# Patient Record
Sex: MALE | Race: WHITE | NOT HISPANIC OR LATINO | URBAN - METROPOLITAN AREA
[De-identification: names, ages, dates, MRNs, and addresses within clinical notes are randomized per-mention and may not be internally consistent; named-entity substitution may affect disease eponyms.]

---

## 2023-09-14 LAB
ANION GAP IN SER/PLAS: 9 MMOL/L (ref 10–20)
BASOPHILS (10*3/UL) IN BLOOD BY AUTOMATED COUNT: 0.02 X10E9/L (ref 0–0.1)
BASOPHILS/100 LEUKOCYTES IN BLOOD BY AUTOMATED COUNT: 0.3 % (ref 0–2)
CALCIUM (MG/DL) IN SER/PLAS: 9.5 MG/DL (ref 8.6–10.3)
CARBON DIOXIDE, TOTAL (MMOL/L) IN SER/PLAS: 31 MMOL/L (ref 21–32)
CHLORIDE (MMOL/L) IN SER/PLAS: 102 MMOL/L (ref 98–107)
CREATININE (MG/DL) IN SER/PLAS: 0.9 MG/DL (ref 0.5–1.3)
EOSINOPHILS (10*3/UL) IN BLOOD BY AUTOMATED COUNT: 0.18 X10E9/L (ref 0–0.7)
EOSINOPHILS/100 LEUKOCYTES IN BLOOD BY AUTOMATED COUNT: 2.7 % (ref 0–6)
ERYTHROCYTE DISTRIBUTION WIDTH (RATIO) BY AUTOMATED COUNT: 11.9 % (ref 11.5–14.5)
ERYTHROCYTE MEAN CORPUSCULAR HEMOGLOBIN CONCENTRATION (G/DL) BY AUTOMATED: 33.3 G/DL (ref 32–36)
ERYTHROCYTE MEAN CORPUSCULAR VOLUME (FL) BY AUTOMATED COUNT: 88 FL (ref 80–100)
ERYTHROCYTES (10*6/UL) IN BLOOD BY AUTOMATED COUNT: 5.05 X10E12/L (ref 4.5–5.9)
GFR MALE: >90 ML/MIN/1.73M2
GLUCOSE (MG/DL) IN SER/PLAS: 177 MG/DL (ref 74–99)
HEMATOCRIT (%) IN BLOOD BY AUTOMATED COUNT: 44.4 % (ref 41–52)
HEMOGLOBIN (G/DL) IN BLOOD: 14.8 G/DL (ref 13.5–17.5)
IMMATURE GRANULOCYTES/100 LEUKOCYTES IN BLOOD BY AUTOMATED COUNT: 0.3 % (ref 0–0.9)
INR IN PPP BY COAGULATION ASSAY: 1 (ref 0.9–1.1)
LEUKOCYTES (10*3/UL) IN BLOOD BY AUTOMATED COUNT: 6.7 X10E9/L (ref 4.4–11.3)
LYMPHOCYTES (10*3/UL) IN BLOOD BY AUTOMATED COUNT: 1.38 X10E9/L (ref 1.2–4.8)
LYMPHOCYTES/100 LEUKOCYTES IN BLOOD BY AUTOMATED COUNT: 20.6 % (ref 13–44)
MONOCYTES (10*3/UL) IN BLOOD BY AUTOMATED COUNT: 0.6 X10E9/L (ref 0.1–1)
MONOCYTES/100 LEUKOCYTES IN BLOOD BY AUTOMATED COUNT: 8.9 % (ref 2–10)
NEUTROPHILS (10*3/UL) IN BLOOD BY AUTOMATED COUNT: 4.51 X10E9/L (ref 1.2–7.7)
NEUTROPHILS/100 LEUKOCYTES IN BLOOD BY AUTOMATED COUNT: 67.2 % (ref 40–80)
PLATELETS (10*3/UL) IN BLOOD AUTOMATED COUNT: 163 X10E9/L (ref 150–450)
POTASSIUM (MMOL/L) IN SER/PLAS: 4.3 MMOL/L (ref 3.5–5.3)
PROTHROMBIN TIME (PT) IN PPP BY COAGULATION ASSAY: 11.8 SEC (ref 9.8–12.8)
SODIUM (MMOL/L) IN SER/PLAS: 138 MMOL/L (ref 136–145)
UREA NITROGEN (MG/DL) IN SER/PLAS: 19 MG/DL (ref 6–23)

## 2023-09-19 ENCOUNTER — HOSPITAL ENCOUNTER (OUTPATIENT)
Facility: HOSPITAL | Age: 68
Setting detail: OUTPATIENT SURGERY
End: 2023-09-19
Attending: STUDENT IN AN ORGANIZED HEALTH CARE EDUCATION/TRAINING PROGRAM | Admitting: STUDENT IN AN ORGANIZED HEALTH CARE EDUCATION/TRAINING PROGRAM
Payer: MEDICARE

## 2023-09-19 DIAGNOSIS — S93.124D DISLOCATION OF METATARSOPHALANGEAL JOINT OF LESSER TOE OF RIGHT FOOT, SUBSEQUENT ENCOUNTER: Primary | ICD-10-CM

## 2023-09-20 ENCOUNTER — HOSPITAL ENCOUNTER (OUTPATIENT)
Facility: HOSPITAL | Age: 68
Setting detail: OUTPATIENT SURGERY
End: 2023-09-20
Admitting: STUDENT IN AN ORGANIZED HEALTH CARE EDUCATION/TRAINING PROGRAM
Payer: MEDICARE

## 2023-09-28 ENCOUNTER — HOSPITAL ENCOUNTER (OUTPATIENT)
Dept: DATA CONVERSION | Facility: HOSPITAL | Age: 68
Discharge: HOME | End: 2023-09-28

## 2023-09-28 DIAGNOSIS — S93.124A DISLOCATION OF METATARSOPHALANGEAL JOINT OF RIGHT LESSER TOE(S), INITIAL ENCOUNTER: ICD-10-CM

## 2023-09-28 LAB
MRSA DNA SPEC QL NAA+PROBE: NEGATIVE
SPECIMEN SOURCE: NORMAL

## 2023-10-02 ENCOUNTER — PREP FOR PROCEDURE (OUTPATIENT)
Dept: ORTHOPEDIC SURGERY | Facility: CLINIC | Age: 68
End: 2023-10-02
Payer: MEDICARE

## 2023-10-02 ENCOUNTER — TELEPHONE (OUTPATIENT)
Dept: ORTHOPEDIC SURGERY | Facility: CLINIC | Age: 68
End: 2023-10-02
Payer: MEDICARE

## 2023-10-02 DIAGNOSIS — Z95.0 PACEMAKER: Primary | ICD-10-CM

## 2023-10-02 NOTE — TELEPHONE ENCOUNTER
I contacted the patient via the number listed in the chart, [phone].  I was informed by PAT for [facility] that patient's schedule surgery for 10/05/2023 would be canceled as he has a pacemaker which has not been interrogated and the recommendation has been made for cardiac clearance prior to consideration for surgery.  I reviewed this with the patient and have placed a referral to cardiology for evaluation.  I will work with my  to obtain clearance and reschedule him.    Yogi Melo MD, GISELLE  Department of Orthopaedic Surgery  [facility]    Note dictated with [employer] software.  Completed without full type editing and sent to avoid delay.

## 2023-10-11 ENCOUNTER — HOSPITAL ENCOUNTER (OUTPATIENT)
Dept: CARDIOLOGY | Facility: HOSPITAL | Age: 68
Discharge: HOME | End: 2023-10-11
Payer: MEDICARE

## 2023-10-11 ENCOUNTER — OFFICE VISIT (OUTPATIENT)
Dept: CARDIOLOGY | Facility: HOSPITAL | Age: 68
End: 2023-10-11
Payer: MEDICARE

## 2023-10-11 VITALS
HEART RATE: 86 BPM | OXYGEN SATURATION: 93 % | DIASTOLIC BLOOD PRESSURE: 97 MMHG | SYSTOLIC BLOOD PRESSURE: 140 MMHG | WEIGHT: 198 LBS

## 2023-10-11 DIAGNOSIS — Z95.0 PACEMAKER: ICD-10-CM

## 2023-10-11 DIAGNOSIS — Z95.0 PRESENCE OF PERMANENT CARDIAC PACEMAKER: ICD-10-CM

## 2023-10-11 DIAGNOSIS — I44.1 SECOND DEGREE AV BLOCK: Primary | ICD-10-CM

## 2023-10-11 DIAGNOSIS — Z00.00 HEALTHCARE MAINTENANCE: ICD-10-CM

## 2023-10-11 DIAGNOSIS — I44.1 SECOND DEGREE AV BLOCK: ICD-10-CM

## 2023-10-11 PROCEDURE — 1159F MED LIST DOCD IN RCRD: CPT | Performed by: NURSE PRACTITIONER

## 2023-10-11 PROCEDURE — 93290 INTERROG DEV EVAL ICPMS IP: CPT

## 2023-10-11 PROCEDURE — 1126F AMNT PAIN NOTED NONE PRSNT: CPT | Performed by: NURSE PRACTITIONER

## 2023-10-11 PROCEDURE — 99204 OFFICE O/P NEW MOD 45 MIN: CPT | Performed by: NURSE PRACTITIONER

## 2023-10-11 PROCEDURE — 99214 OFFICE O/P EST MOD 30 MIN: CPT | Performed by: NURSE PRACTITIONER

## 2023-10-11 PROCEDURE — 93281 PM DEVICE PROGR EVAL MULTI: CPT

## 2023-10-11 PROCEDURE — 93290 INTERROG DEV EVAL ICPMS IP: CPT | Performed by: INTERNAL MEDICINE

## 2023-10-11 PROCEDURE — 1160F RVW MEDS BY RX/DR IN RCRD: CPT | Performed by: NURSE PRACTITIONER

## 2023-10-11 PROCEDURE — 93281 PM DEVICE PROGR EVAL MULTI: CPT | Performed by: INTERNAL MEDICINE

## 2023-10-11 RX ORDER — METFORMIN HYDROCHLORIDE 500 MG/1
500 TABLET ORAL
COMMUNITY
End: 2024-04-04 | Stop reason: SDUPTHER

## 2023-10-11 ASSESSMENT — PAIN SCALES - GENERAL: PAINLEVEL: 0-NO PAIN

## 2023-10-11 NOTE — PROGRESS NOTES
Chief Complaint:  Mr Moody is a 67yo patient referred by pre-admission testing to Cardiology due to permanent pacemaker.     HPI  67yo patient here for cardiovascular risk stratification prior to orthopedic surgery.     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   Social Hx: Never smoker. Denies EtoH/illicit drug use.   Family Hx: Father- CAD s/p MI @ 75yo.     Denies chest pain. Denies palpitations. Denies SOB on exertion. Denies orthopnea. Denies lightheadedness, dizziness, and syncope. Endorses isolated episode of vertigo x 1 hour c/b fall. Denies edema. Eating/drinking well. Denies N/V/D. Thinks PPM has not been interrogated since 0324-8570.     Review of Systems   Constitutional: Negative for decreased appetite, weight gain and weight loss.   Cardiovascular:  Negative for chest pain, dyspnea on exertion, irregular heartbeat, leg swelling, near-syncope, orthopnea, palpitations, paroxysmal nocturnal dyspnea and syncope.   Respiratory:  Negative for shortness of breath and sleep disturbances due to breathing.    Gastrointestinal:  Negative for diarrhea, nausea and vomiting.   Neurological:  Positive for vertigo. Negative for dizziness and light-headedness.     Visit Vitals  BP (!) 140/97   Pulse 86   Wt 89.8 kg (198 lb)   SpO2 93%   Smoking Status Never Assessed       Objective   Vitals reviewed.   Constitutional:       Appearance: Healthy appearance.   Neck:      Vascular: No hepatojugular reflux or JVD. JVD normal.   Pulmonary:      Effort: Pulmonary effort is normal.      Breath sounds: Normal breath sounds.   Cardiovascular:      Normal rate. Regular rhythm.      Murmurs: There is no murmur.      No gallop.  No click. No rub.   Edema:     Peripheral edema absent.   Abdominal:      General: There is no distension.   Skin:     General: Skin is warm and dry.   Neurological:      Mental Status: Alert and oriented to person, place and time.         Lab  "Review:   Lab Results   Component Value Date     09/14/2023    K 4.3 09/14/2023     09/14/2023    CO2 31 09/14/2023    BUN 19 09/14/2023    CREATININE 0.90 09/14/2023    GLUCOSE 177 (H) 09/14/2023    CALCIUM 9.5 09/14/2023     Lab Results   Component Value Date    WBC 6.7 09/14/2023    HGB 14.8 09/14/2023    HCT 44.4 09/14/2023    MCV 88 09/14/2023     09/14/2023     No results found for: \"CHOL\", \"TRIG\", \"HDL\", \"LDLDIRECT\"      Current Outpatient Medications:     metFORMIN (Glucophage) 500 mg tablet, Take 1 tablet (500 mg) by mouth once daily with a meal., Disp: , Rfl:       Assessment/Plan   Cardiovascular Risk Stratification  Asymptomatic from a cardiovascular standpoint. Appears euvolemic and hypertensive on exam. Unable to located EKG reportedly performed on 10/9/23.  CTA coronary 2018 [facility] w/normal origin of coronary arteries. No significant atherosclerotic change or stenotic disease in left main, LCx. Severely calcified plaque in pLAD w/25-50% stenosis and mild plaque with <25% stenosis in RCA. RCRI 0 points Class I Risk w/3.9% 30-day risk of death, MI, or cardiac arrest. Referral to Primary Care for health maintenance and ASCVD prevention.     Hx of symptomatic 2nd AVB s/p PPM   Episode of precordial pain, vertigo on exertion. PPM placed at [facility] in 2018, followed by Dr. Virgen/Dr. Pace. EP MELODY Elmo Barton performed device inerrogation today in visit. Referral to establish with Electrophysiology.   "

## 2023-10-12 ASSESSMENT — ENCOUNTER SYMPTOMS
NAUSEA: 0
IRREGULAR HEARTBEAT: 0
PND: 0
SHORTNESS OF BREATH: 0
DECREASED APPETITE: 0
DYSPNEA ON EXERTION: 0
LIGHT-HEADEDNESS: 0
SYNCOPE: 0
PALPITATIONS: 0
NEAR-SYNCOPE: 0
ORTHOPNEA: 0
DIARRHEA: 0
WEIGHT GAIN: 0
VERTIGO: 1
SLEEP DISTURBANCES DUE TO BREATHING: 0
DIZZINESS: 0
VOMITING: 0
WEIGHT LOSS: 0

## 2023-10-17 ENCOUNTER — PREP FOR PROCEDURE (OUTPATIENT)
Dept: ORTHOPEDIC SURGERY | Facility: HOSPITAL | Age: 68
End: 2023-10-17
Payer: MEDICARE

## 2023-10-17 DIAGNOSIS — S93.124D DISLOCATION OF METATARSOPHALANGEAL JOINT OF LESSER TOE OF RIGHT FOOT, SUBSEQUENT ENCOUNTER: Primary | ICD-10-CM

## 2023-10-17 PROBLEM — S93.124A DISLOCATION OF METATARSOPHALANGEAL JOINT OF LESSER TOE OF RIGHT FOOT: Status: ACTIVE | Noted: 2023-10-17

## 2023-10-17 RX ORDER — CEFAZOLIN SODIUM 2 G/100ML
2 INJECTION, SOLUTION INTRAVENOUS ONCE
Status: CANCELLED | OUTPATIENT
Start: 2023-10-17 | End: 2023-10-17

## 2023-10-18 ENCOUNTER — ANESTHESIA EVENT (OUTPATIENT)
Dept: OPERATING ROOM | Facility: HOSPITAL | Age: 68
End: 2023-10-18
Payer: MEDICARE

## 2023-10-19 ENCOUNTER — HOSPITAL ENCOUNTER (OUTPATIENT)
Facility: HOSPITAL | Age: 68
Setting detail: OUTPATIENT SURGERY
Discharge: HOME | End: 2023-10-19
Attending: STUDENT IN AN ORGANIZED HEALTH CARE EDUCATION/TRAINING PROGRAM | Admitting: STUDENT IN AN ORGANIZED HEALTH CARE EDUCATION/TRAINING PROGRAM
Payer: MEDICARE

## 2023-10-19 ENCOUNTER — ANESTHESIA (OUTPATIENT)
Dept: OPERATING ROOM | Facility: HOSPITAL | Age: 68
End: 2023-10-19
Payer: MEDICARE

## 2023-10-19 ENCOUNTER — APPOINTMENT (OUTPATIENT)
Dept: RADIOLOGY | Facility: HOSPITAL | Age: 68
End: 2023-10-19
Payer: MEDICARE

## 2023-10-19 VITALS
RESPIRATION RATE: 15 BRPM | BODY MASS INDEX: 27.87 KG/M2 | HEIGHT: 70 IN | WEIGHT: 194.67 LBS | HEART RATE: 65 BPM | TEMPERATURE: 96.8 F | OXYGEN SATURATION: 95 % | SYSTOLIC BLOOD PRESSURE: 145 MMHG | DIASTOLIC BLOOD PRESSURE: 64 MMHG

## 2023-10-19 DIAGNOSIS — M20.5X1 ACQUIRED CLAW TOE OF RIGHT FOOT: ICD-10-CM

## 2023-10-19 DIAGNOSIS — S93.124D DISLOCATION OF METATARSOPHALANGEAL JOINT OF LESSER TOE OF RIGHT FOOT, SUBSEQUENT ENCOUNTER: Primary | ICD-10-CM

## 2023-10-19 LAB
GLUCOSE BLD MANUAL STRIP-MCNC: 160 MG/DL (ref 74–99)
GLUCOSE BLD MANUAL STRIP-MCNC: 201 MG/DL (ref 74–99)

## 2023-10-19 PROCEDURE — 7100000002 HC RECOVERY ROOM TIME - EACH INCREMENTAL 1 MINUTE: Performed by: STUDENT IN AN ORGANIZED HEALTH CARE EDUCATION/TRAINING PROGRAM

## 2023-10-19 PROCEDURE — 28285 REPAIR OF HAMMERTOE: CPT | Performed by: STUDENT IN AN ORGANIZED HEALTH CARE EDUCATION/TRAINING PROGRAM

## 2023-10-19 PROCEDURE — 7100000010 HC PHASE TWO TIME - EACH INCREMENTAL 1 MINUTE: Performed by: STUDENT IN AN ORGANIZED HEALTH CARE EDUCATION/TRAINING PROGRAM

## 2023-10-19 PROCEDURE — 2720000007 HC OR 272 NO HCPCS: Performed by: STUDENT IN AN ORGANIZED HEALTH CARE EDUCATION/TRAINING PROGRAM

## 2023-10-19 PROCEDURE — 7100000001 HC RECOVERY ROOM TIME - INITIAL BASE CHARGE: Performed by: STUDENT IN AN ORGANIZED HEALTH CARE EDUCATION/TRAINING PROGRAM

## 2023-10-19 PROCEDURE — 28308 INCISION OF METATARSAL: CPT | Performed by: STUDENT IN AN ORGANIZED HEALTH CARE EDUCATION/TRAINING PROGRAM

## 2023-10-19 PROCEDURE — A4217 STERILE WATER/SALINE, 500 ML: HCPCS | Performed by: STUDENT IN AN ORGANIZED HEALTH CARE EDUCATION/TRAINING PROGRAM

## 2023-10-19 PROCEDURE — 3600000009 HC OR TIME - EACH INCREMENTAL 1 MINUTE - PROCEDURE LEVEL FOUR: Performed by: STUDENT IN AN ORGANIZED HEALTH CARE EDUCATION/TRAINING PROGRAM

## 2023-10-19 PROCEDURE — 3700000002 HC GENERAL ANESTHESIA TIME - EACH INCREMENTAL 1 MINUTE: Performed by: STUDENT IN AN ORGANIZED HEALTH CARE EDUCATION/TRAINING PROGRAM

## 2023-10-19 PROCEDURE — C1713 ANCHOR/SCREW BN/BN,TIS/BN: HCPCS | Performed by: STUDENT IN AN ORGANIZED HEALTH CARE EDUCATION/TRAINING PROGRAM

## 2023-10-19 PROCEDURE — 7100000009 HC PHASE TWO TIME - INITIAL BASE CHARGE: Performed by: STUDENT IN AN ORGANIZED HEALTH CARE EDUCATION/TRAINING PROGRAM

## 2023-10-19 PROCEDURE — 76000 FLUOROSCOPY <1 HR PHYS/QHP: CPT | Mod: 59

## 2023-10-19 PROCEDURE — 28645 REPAIR TOE DISLOCATION: CPT | Performed by: STUDENT IN AN ORGANIZED HEALTH CARE EDUCATION/TRAINING PROGRAM

## 2023-10-19 PROCEDURE — 2500000005 HC RX 250 GENERAL PHARMACY W/O HCPCS: Performed by: STUDENT IN AN ORGANIZED HEALTH CARE EDUCATION/TRAINING PROGRAM

## 2023-10-19 PROCEDURE — 3600000004 HC OR TIME - INITIAL BASE CHARGE - PROCEDURE LEVEL FOUR: Performed by: STUDENT IN AN ORGANIZED HEALTH CARE EDUCATION/TRAINING PROGRAM

## 2023-10-19 PROCEDURE — 82947 ASSAY GLUCOSE BLOOD QUANT: CPT

## 2023-10-19 PROCEDURE — 3700000001 HC GENERAL ANESTHESIA TIME - INITIAL BASE CHARGE: Performed by: STUDENT IN AN ORGANIZED HEALTH CARE EDUCATION/TRAINING PROGRAM

## 2023-10-19 PROCEDURE — 2500000004 HC RX 250 GENERAL PHARMACY W/ HCPCS (ALT 636 FOR OP/ED): Performed by: STUDENT IN AN ORGANIZED HEALTH CARE EDUCATION/TRAINING PROGRAM

## 2023-10-19 PROCEDURE — 82947 ASSAY GLUCOSE BLOOD QUANT: CPT | Mod: 59

## 2023-10-19 PROCEDURE — 2780000003 HC OR 278 NO HCPCS: Performed by: STUDENT IN AN ORGANIZED HEALTH CARE EDUCATION/TRAINING PROGRAM

## 2023-10-19 PROCEDURE — 99024 POSTOP FOLLOW-UP VISIT: CPT | Performed by: STUDENT IN AN ORGANIZED HEALTH CARE EDUCATION/TRAINING PROGRAM

## 2023-10-19 RX ORDER — ONDANSETRON HYDROCHLORIDE 2 MG/ML
INJECTION, SOLUTION INTRAVENOUS AS NEEDED
Status: DISCONTINUED | OUTPATIENT
Start: 2023-10-19 | End: 2023-10-19

## 2023-10-19 RX ORDER — MIDAZOLAM HYDROCHLORIDE 1 MG/ML
2 INJECTION, SOLUTION INTRAMUSCULAR; INTRAVENOUS ONCE
Status: COMPLETED | OUTPATIENT
Start: 2023-10-19 | End: 2023-10-19

## 2023-10-19 RX ORDER — ONDANSETRON HYDROCHLORIDE 2 MG/ML
4 INJECTION, SOLUTION INTRAVENOUS ONCE AS NEEDED
Status: DISCONTINUED | OUTPATIENT
Start: 2023-10-19 | End: 2023-10-19 | Stop reason: HOSPADM

## 2023-10-19 RX ORDER — FENTANYL CITRATE 50 UG/ML
50 INJECTION, SOLUTION INTRAMUSCULAR; INTRAVENOUS ONCE
Status: COMPLETED | OUTPATIENT
Start: 2023-10-19 | End: 2023-10-19

## 2023-10-19 RX ORDER — ASPIRIN 325 MG
325 TABLET, DELAYED RELEASE (ENTERIC COATED) ORAL
Qty: 84 TABLET | Refills: 0 | Status: SHIPPED | OUTPATIENT
Start: 2023-10-19 | End: 2023-11-30

## 2023-10-19 RX ORDER — DOCUSATE SODIUM 100 MG/1
100 CAPSULE, LIQUID FILLED ORAL 2 TIMES DAILY PRN
Qty: 20 CAPSULE | Refills: 0 | Status: SHIPPED | OUTPATIENT
Start: 2023-10-19 | End: 2023-10-29

## 2023-10-19 RX ORDER — LIDOCAINE HYDROCHLORIDE 10 MG/ML
INJECTION INFILTRATION; PERINEURAL AS NEEDED
Status: DISCONTINUED | OUTPATIENT
Start: 2023-10-19 | End: 2023-10-19

## 2023-10-19 RX ORDER — FENTANYL CITRATE 50 UG/ML
50 INJECTION, SOLUTION INTRAMUSCULAR; INTRAVENOUS EVERY 5 MIN PRN
Status: DISCONTINUED | OUTPATIENT
Start: 2023-10-19 | End: 2023-10-19 | Stop reason: HOSPADM

## 2023-10-19 RX ORDER — FENTANYL CITRATE 50 UG/ML
25 INJECTION, SOLUTION INTRAMUSCULAR; INTRAVENOUS EVERY 5 MIN PRN
Status: DISCONTINUED | OUTPATIENT
Start: 2023-10-19 | End: 2023-10-19 | Stop reason: HOSPADM

## 2023-10-19 RX ORDER — SODIUM CHLORIDE 0.9 G/100ML
IRRIGANT IRRIGATION AS NEEDED
Status: DISCONTINUED | OUTPATIENT
Start: 2023-10-19 | End: 2023-10-19 | Stop reason: HOSPADM

## 2023-10-19 RX ORDER — ALBUTEROL SULFATE 0.83 MG/ML
2.5 SOLUTION RESPIRATORY (INHALATION) ONCE AS NEEDED
Status: DISCONTINUED | OUTPATIENT
Start: 2023-10-19 | End: 2023-10-19 | Stop reason: HOSPADM

## 2023-10-19 RX ORDER — CEFAZOLIN SODIUM 2 G/100ML
2 INJECTION, SOLUTION INTRAVENOUS ONCE
Status: COMPLETED | OUTPATIENT
Start: 2023-10-19 | End: 2023-10-19

## 2023-10-19 RX ORDER — IPRATROPIUM BROMIDE 0.5 MG/2.5ML
500 SOLUTION RESPIRATORY (INHALATION) ONCE
Status: DISCONTINUED | OUTPATIENT
Start: 2023-10-19 | End: 2023-10-19 | Stop reason: HOSPADM

## 2023-10-19 RX ORDER — OXYCODONE HYDROCHLORIDE 5 MG/1
5 TABLET ORAL EVERY 4 HOURS PRN
Qty: 25 TABLET | Refills: 0 | Status: SHIPPED | OUTPATIENT
Start: 2023-10-19 | End: 2023-10-24

## 2023-10-19 RX ORDER — LABETALOL HYDROCHLORIDE 5 MG/ML
5 INJECTION, SOLUTION INTRAVENOUS ONCE AS NEEDED
Status: DISCONTINUED | OUTPATIENT
Start: 2023-10-19 | End: 2023-10-19 | Stop reason: HOSPADM

## 2023-10-19 RX ORDER — SODIUM CHLORIDE, SODIUM LACTATE, POTASSIUM CHLORIDE, CALCIUM CHLORIDE 600; 310; 30; 20 MG/100ML; MG/100ML; MG/100ML; MG/100ML
100 INJECTION, SOLUTION INTRAVENOUS CONTINUOUS
Status: DISCONTINUED | OUTPATIENT
Start: 2023-10-19 | End: 2023-10-19 | Stop reason: HOSPADM

## 2023-10-19 RX ORDER — DIPHENHYDRAMINE HYDROCHLORIDE 50 MG/ML
12.5 INJECTION INTRAMUSCULAR; INTRAVENOUS ONCE AS NEEDED
Status: DISCONTINUED | OUTPATIENT
Start: 2023-10-19 | End: 2023-10-19 | Stop reason: HOSPADM

## 2023-10-19 RX ORDER — PROPOFOL 10 MG/ML
INJECTION, EMULSION INTRAVENOUS AS NEEDED
Status: DISCONTINUED | OUTPATIENT
Start: 2023-10-19 | End: 2023-10-19

## 2023-10-19 RX ORDER — SODIUM CHLORIDE, SODIUM LACTATE, POTASSIUM CHLORIDE, CALCIUM CHLORIDE 600; 310; 30; 20 MG/100ML; MG/100ML; MG/100ML; MG/100ML
50 INJECTION, SOLUTION INTRAVENOUS CONTINUOUS
Status: DISCONTINUED | OUTPATIENT
Start: 2023-10-19 | End: 2023-10-19

## 2023-10-19 RX ORDER — HYDRALAZINE HYDROCHLORIDE 20 MG/ML
5 INJECTION INTRAMUSCULAR; INTRAVENOUS EVERY 30 MIN PRN
Status: DISCONTINUED | OUTPATIENT
Start: 2023-10-19 | End: 2023-10-19 | Stop reason: HOSPADM

## 2023-10-19 RX ORDER — DEXAMETHASONE SODIUM PHOSPHATE 4 MG/ML
INJECTION, SOLUTION INTRA-ARTICULAR; INTRALESIONAL; INTRAMUSCULAR; INTRAVENOUS; SOFT TISSUE AS NEEDED
Status: DISCONTINUED | OUTPATIENT
Start: 2023-10-19 | End: 2023-10-19

## 2023-10-19 RX ORDER — FENTANYL CITRATE 50 UG/ML
INJECTION, SOLUTION INTRAMUSCULAR; INTRAVENOUS AS NEEDED
Status: DISCONTINUED | OUTPATIENT
Start: 2023-10-19 | End: 2023-10-19

## 2023-10-19 RX ADMIN — SODIUM CHLORIDE, SODIUM LACTATE, POTASSIUM CHLORIDE, AND CALCIUM CHLORIDE: 600; 310; 30; 20 INJECTION, SOLUTION INTRAVENOUS at 07:24

## 2023-10-19 RX ADMIN — LIDOCAINE HYDROCHLORIDE 5 ML: 10 INJECTION, SOLUTION INFILTRATION; PERINEURAL at 07:23

## 2023-10-19 RX ADMIN — PROPOFOL 30 MG: 10 INJECTION, EMULSION INTRAVENOUS at 10:22

## 2023-10-19 RX ADMIN — SODIUM CHLORIDE, SODIUM LACTATE, POTASSIUM CHLORIDE, AND CALCIUM CHLORIDE 50 ML/HR: 600; 310; 30; 20 INJECTION, SOLUTION INTRAVENOUS at 06:56

## 2023-10-19 RX ADMIN — MIDAZOLAM 2 MG: 1 INJECTION INTRAMUSCULAR; INTRAVENOUS at 06:57

## 2023-10-19 RX ADMIN — PROPOFOL 170 MG: 10 INJECTION, EMULSION INTRAVENOUS at 07:23

## 2023-10-19 RX ADMIN — FENTANYL CITRATE 50 MCG: 50 INJECTION INTRAMUSCULAR; INTRAVENOUS at 06:56

## 2023-10-19 RX ADMIN — ONDANSETRON 4 MG: 2 INJECTION INTRAMUSCULAR; INTRAVENOUS at 09:13

## 2023-10-19 RX ADMIN — FENTANYL CITRATE 25 MCG: 0.05 INJECTION, SOLUTION INTRAMUSCULAR; INTRAVENOUS at 09:21

## 2023-10-19 RX ADMIN — DEXAMETHASONE SODIUM PHOSPHATE 4 MG: 4 INJECTION, SOLUTION INTRAMUSCULAR; INTRAVENOUS at 09:13

## 2023-10-19 RX ADMIN — CEFAZOLIN SODIUM 2 G: 2 INJECTION, SOLUTION INTRAVENOUS at 07:26

## 2023-10-19 SDOH — HEALTH STABILITY: MENTAL HEALTH: CURRENT SMOKER: 0

## 2023-10-19 ASSESSMENT — PAIN - FUNCTIONAL ASSESSMENT
PAIN_FUNCTIONAL_ASSESSMENT: 0-10

## 2023-10-19 ASSESSMENT — COLUMBIA-SUICIDE SEVERITY RATING SCALE - C-SSRS
6. HAVE YOU EVER DONE ANYTHING, STARTED TO DO ANYTHING, OR PREPARED TO DO ANYTHING TO END YOUR LIFE?: NO
1. IN THE PAST MONTH, HAVE YOU WISHED YOU WERE DEAD OR WISHED YOU COULD GO TO SLEEP AND NOT WAKE UP?: NO
2. HAVE YOU ACTUALLY HAD ANY THOUGHTS OF KILLING YOURSELF?: NO

## 2023-10-19 ASSESSMENT — PAIN SCALES - GENERAL
PAINLEVEL_OUTOF10: 0 - NO PAIN
PAINLEVEL_OUTOF10: 1
PAINLEVEL_OUTOF10: 0 - NO PAIN
PAINLEVEL_OUTOF10: 0 - NO PAIN

## 2023-10-19 NOTE — ANESTHESIA PROCEDURE NOTES
Airway  Date/Time: 10/19/2023 7:26 AM  Urgency: elective    Airway not difficult    Staffing  Performed: attending   Authorized by: Yoan Soto MD    Performed by: Yoan Soto MD  Patient location during procedure: OR    Indications and Patient Condition  Indications for airway management: anesthesia  Spontaneous ventilation: present  Sedation level: deep  Preoxygenated: yes  Mask difficulty assessment: 0 - not attempted    Final Airway Details  Final airway type: supraglottic airway      Successful airway: classic  Size 4     Number of attempts at approach: 1

## 2023-10-19 NOTE — BRIEF OP NOTE
Date: 10/19/2023  OR Location: [facility] OR    Name: Adams Moody Md, : 1955, Age: 68 y.o., MRN: [id number], Sex: male    Diagnosis  Pre-op Diagnosis     * Dislocation of metatarsophalangeal joint of lesser toe of right foot, subsequent encounter [S93.124D] Post-op Diagnosis     * Dislocation of metatarsophalangeal joint of lesser toe of right foot, subsequent encounter [S93.124D]     Procedures  Osteotomy Digit Foot  93835 - OR OSTEOT W/WO LNGTH SHRT/CORRJ METAR XCP 1ST EA    Open Reduction Internal Fixation Digit Foot  84917 - OR OPEN TX METATARSOPHALANGEAL JOINT DISLOCATION    Repair Joint Digit Foot  62866 - OR CORRECTION HAMMERTOE    Open Reduction Internal Fixation Foot  39467 - OR OPEN TX METATARSOPHALANGEAL JOINT DISLOCATION      Surgeons      * Yogi Melo - Primary    Resident/Fellow/Other Assistant:      Procedure Summary  Anesthesia: Consult  ASA: II  Anesthesia Staff: Anesthesiologist: Yoan Soto MD  Estimated Blood Loss: 30mL  Intra-op Medications:   Medication Name Total Dose   sodium chloride 0.9 % irrigation solution 1,000 mL   ceFAZolin in dextrose (iso-os) (Ancef) IVPB 2 g 2 g   lactated Ringer's infusion 130 mL              Anesthesia Record               Intraprocedure I/O Totals          Intake    lactated Ringer's infusion 1000.00 mL    Total Intake 1000 mL       Output    Est. Blood Loss 30 mL    Total Output 30 mL       Net    Net Volume 970 mL          Specimen: No specimens collected     Staff:   Circulator: Augustina Moses RN  Scrub Person: Beatriz Martin    Findings: Consistent with diagnosis, see operative reort    Complications: None apparent, stable reduction with pin fixaiton    Disposition: PACU - hemodynamically stable.  Condition: stable  Specimens Collected: No specimens collected  Attending Attestation: I was present and scrubbed for the entire procedure.    Yogi Melo MD, GISELLE  Department of Orthopaedic Surgery  [facility]

## 2023-10-19 NOTE — ANESTHESIA PREPROCEDURE EVALUATION
Patient: Adams Moody Md    Procedure Information       Date/Time: 10/19/23 0700    Procedures:       Osteotomy Digit Foot (Right: Foot) - 2/3/4      Open Reduction Internal Fixation Digit Foot (Right: Foot) - 2/3/4    Location: [facility] 04 / [facility]    Surgeons: Yogi Melo MD            Relevant Problems   No relevant active problems       Clinical information reviewed:   Tobacco  Allergies  Meds  Problems  Med Hx  Surg Hx   Fam Hx  Soc   Hx        NPO Detail:  NPO/Void Status  Date of Last Liquid: 10/18/23  Time of Last Liquid: 2300  Date of Last Solid: 10/18/23  Time of Last Solid: 2300  Last Intake Type: Clear fluids  Time of Last Void: 0445         Physical Exam    Airway  Mallampati: II  TM distance: >3 FB  Neck ROM: full     Cardiovascular    Dental    Pulmonary    Abdominal          Anesthesia Plan    ASA 2     general and regional     The patient is not a current smoker.  Patient was not previously instructed to abstain from smoking on day of procedure.  Patient did not smoke on day of procedure.    intravenous induction   Postoperative administration of opioids is intended.  Anesthetic plan and risks discussed with patient.  Use of blood products discussed with patient who consented to blood products.

## 2023-10-19 NOTE — ANESTHESIA PROCEDURE NOTES
Peripheral Block    Patient location during procedure: holding area  Start time: 10/19/2023 6:59 AM  End time: 10/19/2023 7:02 AM  Reason for block: at surgeon's request and post-op pain management  Staffing  Performed: attending   Authorized by: Yoan Soto MD    Performed by: Yoan Soto MD  Preanesthetic Checklist  Completed: patient identified, IV checked, site marked, risks and benefits discussed, surgical consent, monitors and equipment checked, pre-op evaluation and timeout performed   Timeout performed at: 10/19/2023 6:56 AM  Peripheral Block  Patient position: laying flat  Prep: ChloraPrep  Patient monitoring: heart rate, cardiac monitor and continuous pulse ox  Block type: popliteal  Laterality: right  Injection technique: single-shot  Guidance: ultrasound guided  Local infiltration: ropivacaine  Infiltration strength: 0.5 %  Dose: 30 mL  Needle  Needle gauge: 20 G  Needle length: 10 cm  Needle localization: ultrasound guidance  Assessment  Injection assessment: negative aspiration for heme, no paresthesia on injection, incremental injection and local visualized surrounding nerve on ultrasound  Heart rate change: no  Slow fractionated injection: no

## 2023-10-19 NOTE — H&P
History Of Present Illness  67 y/o M w/ R 2/3/4 MTPJ dislocation from episode of vertigo from 08/06/2023. This has been a persistently painful and bothersome condition, especially with ambulation. He has been wearing a boot since I initially saw him in September. He was originally scheduled for surgery and then cancelled for cardiac clearance. He denies changes in his medical history to date.     Past Medical History  He has a past medical history of Pacemaker.    Surgical History  He has no past surgical history on file.     Social History  He reports that he has never smoked. He has never used smokeless tobacco. No history on file for alcohol use and drug use.    Family History  No family history on file.     Allergies  Patient has no known allergies.    Review of Systems  REVIEW OF SYSTEMS  Constitutional: no unplanned weight loss  Psychiatric: no suicidal ideation  ENT: no vision changes, no sinus problems  Pulmonary: no shortness of breath during office visit today  Lymphatic: no enlarged lymph nodes  Cardiovascular: no chest pain or shortness of breath during office visit today  Gastrointestinal: no stomach problems  Genitourinary: no dysuria   Skin: no rashes  Endocrine: no thyroid problems  Neurological: no headache, no numbness  Hematological: no easy bruising  Musculoskeletal: Right foot pain     Physical Exam  PHYSICAL EXAMINATION  Constitutional Exam: well developed and well nourished  Psychiatric Exam: alert and oriented, appropriate mood and behavior  Eye Exam: EOMI  Pulmonary Exam: breathing non-labored, no apparent distress  Lymphatic exam: no appreciable lymphadenopathy in the lower extremities  Cardiovascular exam: RRR to peripheral palpation, DP pulses 2+, PT 2+, toes are pink with good capillary refill, no pitting edema  Skin exam: no open lesions, rashes, abrasions or ulcerations  Neurological exam: sensation to light touch intact in both lower extremities in peripheral and dermatomal  "distributions (except for any abnormalities noted in musculoskeletal exam)    Musculoskeletal exam: Right lower extremity examination.  Pain localized to the plantar aspect of the 2 through 4 metatarsals.  No obvious ecchymosis.  Continued edema by comparison the contralateral side.  MTPJ ROM limited secondary to pain deformity.  Flexible PIPJ/IPJ deformity noted.  Patient has sensation intact to light touch grossly to saphenous, sural, superficial peroneal, deep peroneal and tibial nerve distribution with diminished baseline numbness in the toes by report.  Patient has 5 out of 5 strength in plantarflexion, dorsiflexion EHL.  He has 2+ DP/PT pulse palpated.     Last Recorded Vitals  Blood pressure 157/89, pulse 71, temperature 36.9 °C (98.4 °F), temperature source Tympanic, resp. rate 18, height 1.778 m (5' 10\"), weight 88.3 kg (194 lb 10.7 oz), SpO2 94 %.    Relevant Results      Scheduled medications  ceFAZolin, 2 g, intravenous, Once      Continuous medications     PRN medications    Results for orders placed or performed during the hospital encounter of 10/19/23 (from the past 24 hour(s))   POCT GLUCOSE   Result Value Ref Range    POCT Glucose 160 (H) 74 - 99 mg/dL       Assessment/Plan   Principal Problem:    Dislocation of metatarsophalangeal joint of lesser toe of right foot    68-year-old male with past medical history of NIDDM with right 2 through 4 MTPJ dislocations.  - OR for closed versus open reduction and associated bony/soft tissue realignment procedures to achieve stable reduction.       Yogi Melo MD, GISELLE  Department of Orthopaedic Surgery  [facility]  "

## 2023-10-19 NOTE — ANESTHESIA POSTPROCEDURE EVALUATION
Patient: Adams Moody Md    Procedure Summary       Date: 10/19/23 Room / Location: [facility] / [facility]    Anesthesia Start: 0714 Anesthesia Stop: 1051    Procedures:       Osteotomy Digit Foot (Right: Foot)      Open Reduction Internal Fixation Digit Foot (Right: Foot)      Repair Joint Digit Foot (Right: Foot)      Open Reduction Internal Fixation Foot (Right) Diagnosis:       Dislocation of metatarsophalangeal joint of lesser toe of right foot, subsequent encounter      Acquired claw toe of right foot      (Dislocation of metatarsophalangeal joint of lesser toe of right foot, subsequent encounter [S93.124D])    Surgeons: Yogi Melo MD Responsible Provider: Yoan Soto MD    Anesthesia Type: general, regional ASA Status: 2            Anesthesia Type: general, regional    Vitals Value Taken Time   /83 10/19/23 1126   Temp 36.1 °C (97 °F) 10/19/23 1116   Pulse 64 10/19/23 1126   Resp 18 10/19/23 1126   SpO2 96 % 10/19/23 1126       Anesthesia Post Evaluation    Patient location during evaluation: PACU  Patient participation: complete - patient participated  Level of consciousness: awake  Pain management: adequate  Multimodal analgesia pain management approach  Airway patency: patent  Cardiovascular status: acceptable and hemodynamically stable  Respiratory status: acceptable and spontaneous ventilation  Hydration status: euvolemic    There were no known notable events for this encounter.

## 2023-10-19 NOTE — PERIOPERATIVE NURSING NOTE
1148  Post operative X ray cancelled per Dr. Snyder.  Awakens eaily Denies pain Circulation checks within normal limits. Lungs clear pacing. Foot elevated on pillow with ice bag.  1145 To SDS

## 2023-10-19 NOTE — OP NOTE
Osteotomy Digit Foot (R), Open Reduction Internal Fixation Digit Foot (R), Repair Joint Digit Foot (R), Open Reduction Internal Fixation Foot (R) Operative Note     Date: 10/19/2023  OR Location: [facility]    Name: Adams Moody Md, : 1955, Age: 68 y.o., MRN: [id number], Sex: male    Diagnosis  Pre-op Diagnosis     * Dislocation of metatarsophalangeal joint of lesser toe of right foot, subsequent encounter [S93.124D] Post-op Diagnosis     * Dislocation of metatarsophalangeal joint of lesser toe of right foot, subsequent encounter [S93.124D]     * Acquired claw toe of right foot [M20.5X1]     Procedures  Osteotomy Digit Foot  39164 - ME OSTEOT W/WO LNGTH SHRT/CORRJ METAR XCP 1ST EA    Open Reduction Internal Fixation Digit Foot  96067 - ME OPEN TX METATARSOPHALANGEAL JOINT DISLOCATION    Repair Joint Digit Foot  36424 - ME CORRECTION HAMMERTOE    Open Reduction Internal Fixation Foot  99160 - ME OPEN TX METATARSOPHALANGEAL JOINT DISLOCATION      Surgeons      * Yogi Melo - Primary    Resident/Fellow/Other Assistant:  Surgeon(s) and Role:    Procedure Summary  Anesthesia: Consult  ASA: II  Anesthesia Staff: Anesthesiologist: Yoan Soto MD  Estimated Blood Loss: 30mL  Intra-op Medications:   Medication Name Total Dose   sodium chloride 0.9 % irrigation solution 1,000 mL   ceFAZolin in dextrose (iso-os) (Ancef) IVPB 2 g 2 g   lactated Ringer's infusion 130 mL              Anesthesia Record               Intraprocedure I/O Totals          Intake    lactated Ringer's infusion 1000.00 mL    Total Intake 1000 mL       Output    Est. Blood Loss 30 mL    Total Output 30 mL       Net    Net Volume 970 mL          Specimen: No specimens collected     Staff:   Circulator: Augustina Moses RN  Scrub Person: Beatriz Martin         Drains and/or Catheters: * None in log *    Tourniquet Times:     Total Tourniquet Time Documented:  Thigh (Right) - 113 minutes  Total: Thigh (Right) - 113 minutes       Implants:  Implants       Type Name Action Serial No.       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  Patient was initially seen in an urgent care setting where x-rays were obtained and he was scheduled for outpatient orthopedic follow-up.  Patient presented to me for second opinion on 09/08/2023.  We reviewed potential treatment options including conservative management as well as closed versus open reduction of these dislocations.  I discussed that based on the position of his toes that he may require claw toe corrective surgery to achieve neutral alignment.  In brief, I reviewed the risks, benefits and alternatives to right open versus closed reduction 2/3/4 MTPJ with possible bony and soft tissue realignment procedures to achieve stable reduction.  Risks include but are not limited to infection, wound healing complications, need for further surgery, persistent or worsening pain, postoperative stiffness, bleeding, blood loss requiring a blood transfusion, neurovascular injury, mal- or non-union, recurrent deformity, hardware failure, pin site infection, hardware pain, failure of the procedure, complications of anesthesia, stroke, DVT/PE, myocardial infarction and death. Benefits included decreased pain, improved function. Alternatives included continued conservative management. The patient voiced understanding of the risks, benefits and alternatives and the informed consent was signed,  with both myself and the patient present.  Patient was initially scheduled and then canceled due to cardiac clearance, cardiac clearance was provided from 10/11/2023.  Surgery was scheduled.    OPERATIVE REPORT: On the day of surgery 10/19/2023, the patient was met in the preoperative holding area by members of the orthopedic, anesthesia and nursing teams.  I marked the patient's right lower extremity with indelible ink with the patient as my witness.  The informed consent was again reviewed.  All remaining questions were answered. In the preoperative holding area, the anesthesia team performed a popliteal/sciatic block, please see separate procedure note block.    The patient was then brought back to the operating room [facility].  I led a preoperative timeout, verifying the correct patient, procedure and laterality of procedure to be performed.  We confirmed the appropriate availability of all surgical equipment,  implants, utilization of fluoroscopy and confirmed the administration of pre-surgical IV antibiotic prophylaxis within 1 hour of incision time, 2 g ancef.  All present were in agreement.    Care was handed over to the anesthesia team who provided GETA.  The patient was then transferred onto the operating room table in the supine position with a hip bump.  All bony prominences were padded and an SCD was placed on the contra-lateral extremity. A nonsterile, well-padded thigh tourniquet was placed.  The patient's right lower extremity was then prepped and draped in usual sterile fashion with sterile prep with ChloraPrep.    Under fluoroscopic guidance I then obtained a AP and lateral of the foot demonstrating his 2/3/4 metatarsal phalangeal joint dislocations.  I attempted to perform a closed reduction, this was unsuccessful.  The extremity was then exsanguinated with an Esmarch and the tourniquet was inflated to 275 mmHg for 113 minutes after which time the tourniquet was let down there was  excellent reperfusion of the extremity with palpable DP/PT pulse palpable.    I marked out a standard dorsal approaches to the second third and fourth metatarsophalangeal joints in line with the rays.  I then let a preincision pause, again verifying the correct patient, procedure and laterality of procedure to be performed.  All present were in agreement.  Beginning with the second, I incised through skin and subcutaneous tissue, and meticulous hemostasis was achieved.  The EDL tendon of the second was identified and obviously contracted.  A Z-lengthening of the tendon was performed.  A dorsal capsulotomy was then made, the second MTPJ was obviously dorsally dislocated.  There was dense hypertrophic scar dorsally that was resected.The medial and lateral collateral ligaments were released and blunt retractors were placed to protect the neurovascular bundle.  McGlamry was utilized to release plantar adhesions.  The second MTPJ was unable to be reduced and so I proceeded with a Weil osteotomy of the metatarsal.  An oscillating saw was used to create an osteotomy parallel to the plantar surface of the foot.  The metatarsal head was then displaced proximally to achieve the preoperatively templated shortening and provisionally stabilized with a dorsal plantar K wire. The overhanging dorsal metatarsal cortex was resected.  The MTPJ was then readily reduced and stable.    I then turned my attention to achieving stable fixation of the Weil osteotomy.  Under fluoroscopic guidance a 2.7 mm x 13 mm snap off screw was secured but appeared radiographically short. I then exchanged this for a 2.7 x 15 mm snap off screw which on final tightening, likely owning to the patient's deformity and weakening of the dorsal cortex, displaced the remaining dorsal cortex of the osteotomy site and distracted the osteotomy site.  I therefore turned my attention to proceeding with provisional K wire fixation of the MTPJ and osteotomy transversely.   It was evident at this time that the patient's rigid PIPJ contracture from his claw toe would prevent the appropriate K wire trajectory for stabilization of the MTPJ.    I therefore extended my incision distally towards the distal phalanx of the second.  The extensor schneider was incised and the PIPJ plantar plate and collateral ligaments were released.  Retractors were placed and the the proximal phalanx was then resected at the metaphyseal junction.  The cartilage of the middle phalanx was denuded with a curette.  A K wire was then passed under fluoroscopic guidance in an antegrade fashion from the middle phalanx through the distal toe and then retrograde to stabilize the PIPJ in compression for arthrodesis and align the toe.  The MTPJ was then reduced under both direct visualization and fluoroscopic guidance and the K wire was delivered into the metatarsal.  The construct was fluoroscopically stressed and stable.    I then proceeded with open reduction of the third metatarsophalangeal joint.  I incised through skin and subcutaneous tissue overlying the joint.  Meticulous hemostasis was achieved.  The EDL tendon was obviously contracted and a Z-lengthening was performed.  Once again, a dorsal capsulotomy was made and the proximal phalanx was obviously dorsally dislocated.  Dense hypertrophic scar was resected.  The medial and lateral collateral ligaments were resected and blunt retractors were placed to protect the neurovascular bundle.  McGlamry was utilized to release plantar adhesions.  The third MTPJ was then easily reduced.  Under fluoroscopic guidance I then passed the intramedullary K wire from the distal phalanx transversely through the MTPJ for provisional fixation.  The construct was fluoroscopically stressed and stable.    Finally, I turned my attention to open reduction of the fourth metatarsophalangeal joint.  Again, I incised through the skin and subcutaneous tissue overlying the joint.  Meticulous  hemostasis was achieved.  Again, the EDL tendon was contracted and a Z-lengthening was performed.  The dorsal capsulotomy was made and was hypertrophic, this was resected. The proximal phalanx was obviously dislocated dorsally.  The medial and lateral collateral ligaments were resected.  Blunt retractors were placed to protect the neurovascular bundle.  McClamary was utilized to release the plantar adhesions.  The fourth MTPJ was then easily reduced.  Under fluoroscopic guidance, I can pass an intramedullary K wire from the distal phalanx transversely through the MTPJ for provisional fixation.  The construct was again fluoroscopic stress and stable.    Final fluoroscopic films including AP and lateral views of the foot views were obtained demonstrating second, third and fourth MTPJ reduction with appropriate position and alignment of intramedullary K wire fixation. Excellent compression of the second PIPJ was also noted.    At the conclusion of the case, all digits were noted to be able perfused with excellent capillary refill.    The wounds were copiously irrigated and closed in layers.  2-0 Vicryl was used for the deep fascial layer and to repair the lengthened extensor tendon, the deep dermal layer was closed with 3-0 Monocryl in a buried interrupted fashion and 3-0 nylon in a simple interrupted configuration was used for skin.  The skin was cleansed and dried and dry sterile, compressive, dressings were placed.  The sterile drapes were removed. The patient was then placed into a well-padded short leg splint. All surgical counts were noted to be correct. The patient was then awoken from anesthesia without complication and transferred from the operating room table onto the [facility] and then brought back to the PACU in stable condition.    Post-Operative Plan:   The patient will remain nonweightbearing in their right lower extremity.  He will begin 325 ASA p.o. twice daily for DVT prophylaxis with meals.   The patient was provided with a short course of oxycodone for acute postoperative pain after an appropriate OARRS report was reviewed verifying no concerning findings for abuse.  I have encouraged early mobilization and extremity elevation as tolerated.  I will plan to see the patient back in approximately 1 week for their first postoperative visit.    Complications:  None; patient tolerated the procedure well.    Disposition: PACU - hemodynamically stable.  Condition: stable     Attending Attestation: I was present and scrubbed for the entire procedure.    Yogi Melo MD, GISELLE  Department of Orthopaedic Surgery  [facility]    Note dictated with [employer] software.  Completed without full type editing and sent to avoid delay.

## 2023-10-24 PROBLEM — R55 SYNCOPE: Status: ACTIVE | Noted: 2018-01-29

## 2023-10-24 PROBLEM — R07.2 PRECORDIAL PAIN: Status: ACTIVE | Noted: 2018-01-29

## 2023-10-24 PROBLEM — N52.1 ERECTILE DISORDER DUE TO MEDICAL CONDITION IN MALE: Status: ACTIVE | Noted: 2018-05-07

## 2023-10-24 PROBLEM — I49.9 CARDIAC ARRHYTHMIA: Status: ACTIVE | Noted: 2018-01-29

## 2023-10-24 PROBLEM — E11.40 TYPE 2 DIABETES MELLITUS WITH DIABETIC NEUROPATHY, WITHOUT LONG-TERM CURRENT USE OF INSULIN (MULTI): Status: ACTIVE | Noted: 2018-05-07

## 2023-10-24 RX ORDER — CHLORHEXIDINE GLUCONATE ORAL RINSE 1.2 MG/ML
SOLUTION DENTAL
COMMUNITY
Start: 2023-09-28 | End: 2024-04-04 | Stop reason: WASHOUT

## 2023-10-25 ENCOUNTER — TELEPHONE (OUTPATIENT)
Dept: ORTHOPEDICS | Facility: HOSPITAL | Age: 68
End: 2023-10-25
Payer: MEDICARE

## 2023-10-25 ENCOUNTER — APPOINTMENT (OUTPATIENT)
Dept: ORTHOPEDIC SURGERY | Facility: CLINIC | Age: 68
End: 2023-10-25
Payer: MEDICARE

## 2023-10-25 DIAGNOSIS — S93.124D DISLOCATION OF METATARSOPHALANGEAL JOINT OF LESSER TOE OF RIGHT FOOT, SUBSEQUENT ENCOUNTER: Primary | ICD-10-CM

## 2023-10-25 ASSESSMENT — PAIN SCALES - GENERAL: PAINLEVEL_OUTOF10: 0 - NO PAIN

## 2023-10-25 NOTE — TELEPHONE ENCOUNTER
I contacted the patient via the number listed in the chart, [phone].  Patient was scheduled for 1 week follow-up in the office today.  Apparently, there was a miscommunication regarding appointment time as he had obligations this morning but was on the schedule for 4:30 PM.  I have coordinated to have him scheduled for follow-up on 10/26/2023.  Patient has been without significant pain, no obvious pin migration or wound drainage.    Yogi Melo MD, GISELLE  Department of Orthopaedic Surgery  [facility]

## 2023-10-26 ENCOUNTER — OFFICE VISIT (OUTPATIENT)
Dept: ORTHOPEDIC SURGERY | Facility: CLINIC | Age: 68
End: 2023-10-26
Payer: MEDICARE

## 2023-10-26 DIAGNOSIS — S93.124D DISLOCATION OF METATARSOPHALANGEAL JOINT OF LESSER TOE OF RIGHT FOOT, SUBSEQUENT ENCOUNTER: Primary | ICD-10-CM

## 2023-10-26 PROCEDURE — 1126F AMNT PAIN NOTED NONE PRSNT: CPT | Performed by: STUDENT IN AN ORGANIZED HEALTH CARE EDUCATION/TRAINING PROGRAM

## 2023-10-26 PROCEDURE — 1160F RVW MEDS BY RX/DR IN RCRD: CPT | Performed by: STUDENT IN AN ORGANIZED HEALTH CARE EDUCATION/TRAINING PROGRAM

## 2023-10-26 PROCEDURE — 99024 POSTOP FOLLOW-UP VISIT: CPT | Performed by: STUDENT IN AN ORGANIZED HEALTH CARE EDUCATION/TRAINING PROGRAM

## 2023-10-26 PROCEDURE — 1159F MED LIST DOCD IN RCRD: CPT | Performed by: STUDENT IN AN ORGANIZED HEALTH CARE EDUCATION/TRAINING PROGRAM

## 2023-10-26 PROCEDURE — 1036F TOBACCO NON-USER: CPT | Performed by: STUDENT IN AN ORGANIZED HEALTH CARE EDUCATION/TRAINING PROGRAM

## 2023-10-26 ASSESSMENT — PAIN - FUNCTIONAL ASSESSMENT: PAIN_FUNCTIONAL_ASSESSMENT: 0-10

## 2023-10-26 ASSESSMENT — PAIN SCALES - GENERAL: PAINLEVEL_OUTOF10: 2

## 2023-10-26 ASSESSMENT — PAIN DESCRIPTION - DESCRIPTORS: DESCRIPTORS: BURNING

## 2023-10-26 NOTE — PROGRESS NOTES
ORTHOPAEDIC SURGERY OUTPATIENT PROGRESS NOTE    Chief Complaint:  Right foot pain    History Of Present Illness  Adams Moody Md is a 68 y.o. male 68-year-old male presents for evaluation of right foot pain. Patient states that he sustained an episode of vertigo on 08/06/2023 with what sounds like a hyper dorsiflexion moment to his forefoot. Patient was initially seen in an urgent care setting, where x-rays were obtained and the patient was placed into a walking boot and referred for follow-up with foot and ankle orthopedic surgery. Patient had his scheduled follow-up and was recommended to undergo open reduction of his now subacute 2 through 4 dorsal MTPJ dislocations. Patient presents for second opinion regarding treatment strategies.     Patient currently reports 6 out of 10 pain with ambulation and 2 out of 10 pain at baseline. He has a history of diabetic neuropathy with known decree sensation in his forefoot. Patient has not been taking any anti-inflammatories and continues to work in his neurology practice. He is having difficulty both with standing and with swimming. His foot pain is impairing his activities of daily living and exercise. Patient is otherwise in his usual state of health.     Patient is a current non-smoker, non-insulin-dependent diabetic with no recent HbA1c in our system and is not currently taking any anticoagulation.     09/14/2023: Patient returns for follow-up of his right foot pain. He has been weightbearing as tolerated in a walking boot. Patient reports improved pain with ambulation in the walking boot. He presents with his wife for discussion regarding management of his injury. Patient continues to report moderate to severe pain in the plantar surface of his foot. This severely limits his walking without the boot. He has RLE edema but does not report any associated numbness, tingling.    10/26/2023: Patient returns s/p open reduction 2/3/4 MTPJ with pinning, second Weil osteotomy  and PIPJ arthrodesis from 10/19/2023.  Patient has been compliant with nonweightbearing restrictions utilizing crutches.  He reports minimal pain at this time, 2 out of 10.  He has been taking aspirin for DVT prophylaxis.  He has not noticed any draining from his wounds.  He does report some burning pain.     Past Medical History  Past Medical History:   Diagnosis Date    Pacemaker     medtronic       Surgical History  Recent Surgeries in Orthopaedic Surgery            No cases to display             Social History  Social History     Socioeconomic History    Marital status:      Spouse name: None    Number of children: None    Years of education: None    Highest education level: None   Occupational History    None   Tobacco Use    Smoking status: Never    Smokeless tobacco: Never   Substance and Sexual Activity    Alcohol use: Defer    Drug use: Never    Sexual activity: None   Other Topics Concern    None   Social History Narrative    None     Social Determinants of Health     Financial Resource Strain: Not on file   Food Insecurity: Not on file   Transportation Needs: Not on file   Physical Activity: Not on file   Stress: Not on file   Social Connections: Not on file   Intimate Partner Violence: Not on file   Housing Stability: Not on file       Family History  No family history on file.     Allergies  No Known Allergies    Review of Systems  REVIEW OF SYSTEMS  Constitutional: no unplanned weight loss  Psychiatric: no suicidal ideation  ENT: no vision changes, no sinus problems  Pulmonary: no shortness of breath during office visit today  Lymphatic: no enlarged lymph nodes  Cardiovascular: no chest pain or shortness of breath during office visit today  Gastrointestinal: no stomach problems  Genitourinary: no dysuria   Skin: no rashes  Endocrine: no thyroid problems  Neurological: no headache, no numbness  Hematological: no easy bruising  Musculoskeletal: Right foot pain     Physical Exam  PHYSICAL  EXAMINATION  Constitutional Exam: well developed and well nourished  Psychiatric Exam: alert and oriented, appropriate mood and behavior  Eye Exam: EOMI  Pulmonary Exam: breathing non-labored, no apparent distress  Lymphatic exam: no appreciable lymphadenopathy in the lower extremities  Cardiovascular exam: RRR to peripheral palpation, DP pulses 2+, PT 2+, toes are pink with good capillary refill, no pitting edema  Skin exam: no open lesions, rashes, abrasions or ulcerations  Neurological exam: sensation to light touch intact in both lower extremities in peripheral and dermatomal distributions (except for any abnormalities noted in musculoskeletal exam)    Musculoskeletal exam: Right lower extremity examination.  Short leg plaster splint removed.  Dorsal foot incisions with suture line well approximated.  There is appropriate yasmine-incisional erythema, without tenderness to palpation.  There is no drainage or expressible drainage.  2 through 4 pins intact with Jurgan balls.  There is no obvious pin site drainage.  Patient has sensation intact light touch grossly in a saphenous, sural, superficial peroneal, deep peroneal and tibial nerve distribution.  He has intact PF/DF/EHL.  He has 2+ DP/PT pulse palpated.     Last Recorded Vitals  There were no vitals taken for this visit.    Laboratory Results  No results found for this or any previous visit (from the past 24 hour(s)).     Radiology Results  No new imaging at this visit.    Assessment/Plan:  68-year-old male NIDDM s/p open reduction 2/3/4 MTPJ for chronic dislocation with pinning, second Weil osteotomy and PIPJ arthrodesis from 10/19/2023.  In my impression, the patient should continue nonweightbearing his right lower extremity  in a short leg fiberglass cast with extended toe plate to protect the pins.  I will retain his sutures and have redressed the wound with a compressive dressing.  I have encouraged the patient to elevate his extremity for both pain and  swelling control.  He will continue on 325 ASA p.o. twice daily for DVT prophylaxis.  I will plan to see the patient back in approximate 2 weeks for repeat clinical and radiographic evaluation.  I discussed with the patient that anticipate maintaining the pins for up to 6 weeks, however if he notices any pin site drainage or loosening that we may have to remove the pins sooner.  Upon return, patient require 3 views nonweightbearing right foot.    Yogi Melo MD, GISELLE  Department of Orthopaedic Surgery  [facility]    The diagnosis and treatment plan were reviewed with the patient. All questions were answered. The patient verbalized understanding of the treatment plan. There were no barriers to understanding identified.    Note dictated with [employer] software.  Completed without full type editing and sent to avoid delay.

## 2023-10-27 ENCOUNTER — APPOINTMENT (OUTPATIENT)
Dept: CARDIOLOGY | Facility: HOSPITAL | Age: 68
End: 2023-10-27
Payer: MEDICARE

## 2023-11-09 ENCOUNTER — ANCILLARY PROCEDURE (OUTPATIENT)
Dept: RADIOLOGY | Facility: CLINIC | Age: 68
End: 2023-11-09
Payer: MEDICARE

## 2023-11-09 ENCOUNTER — OFFICE VISIT (OUTPATIENT)
Dept: ORTHOPEDIC SURGERY | Facility: CLINIC | Age: 68
End: 2023-11-09
Payer: MEDICARE

## 2023-11-09 DIAGNOSIS — S93.124D DISLOCATION OF METATARSOPHALANGEAL JOINT OF LESSER TOE OF RIGHT FOOT, SUBSEQUENT ENCOUNTER: ICD-10-CM

## 2023-11-09 DIAGNOSIS — S93.124D DISLOCATION OF METATARSOPHALANGEAL JOINT OF LESSER TOE OF RIGHT FOOT, SUBSEQUENT ENCOUNTER: Primary | ICD-10-CM

## 2023-11-09 PROCEDURE — 1159F MED LIST DOCD IN RCRD: CPT | Performed by: STUDENT IN AN ORGANIZED HEALTH CARE EDUCATION/TRAINING PROGRAM

## 2023-11-09 PROCEDURE — 73630 X-RAY EXAM OF FOOT: CPT | Mod: RIGHT SIDE | Performed by: RADIOLOGY

## 2023-11-09 PROCEDURE — 99024 POSTOP FOLLOW-UP VISIT: CPT | Performed by: STUDENT IN AN ORGANIZED HEALTH CARE EDUCATION/TRAINING PROGRAM

## 2023-11-09 PROCEDURE — 3079F DIAST BP 80-89 MM HG: CPT | Performed by: STUDENT IN AN ORGANIZED HEALTH CARE EDUCATION/TRAINING PROGRAM

## 2023-11-09 PROCEDURE — 1126F AMNT PAIN NOTED NONE PRSNT: CPT | Performed by: STUDENT IN AN ORGANIZED HEALTH CARE EDUCATION/TRAINING PROGRAM

## 2023-11-09 PROCEDURE — 1160F RVW MEDS BY RX/DR IN RCRD: CPT | Performed by: STUDENT IN AN ORGANIZED HEALTH CARE EDUCATION/TRAINING PROGRAM

## 2023-11-09 PROCEDURE — 1036F TOBACCO NON-USER: CPT | Performed by: STUDENT IN AN ORGANIZED HEALTH CARE EDUCATION/TRAINING PROGRAM

## 2023-11-09 PROCEDURE — 73630 X-RAY EXAM OF FOOT: CPT | Mod: RT

## 2023-11-09 PROCEDURE — 3077F SYST BP >= 140 MM HG: CPT | Performed by: STUDENT IN AN ORGANIZED HEALTH CARE EDUCATION/TRAINING PROGRAM

## 2023-11-09 ASSESSMENT — PAIN DESCRIPTION - DESCRIPTORS: DESCRIPTORS: TENDER

## 2023-11-09 ASSESSMENT — PAIN SCALES - GENERAL: PAINLEVEL_OUTOF10: 2

## 2023-11-09 ASSESSMENT — PAIN - FUNCTIONAL ASSESSMENT: PAIN_FUNCTIONAL_ASSESSMENT: 0-10

## 2023-11-10 NOTE — PROGRESS NOTES
ORTHOPAEDIC SURGERY OUTPATIENT PROGRESS NOTE    Chief Complaint:  Right foot pain    History Of Present Illness  Adams Moody Md is a 68 y.o. male 68-year-old male presents for evaluation of right foot pain. Patient states that he sustained an episode of vertigo on 08/06/2023 with what sounds like a hyper dorsiflexion moment to his forefoot. Patient was initially seen in an urgent care setting, where x-rays were obtained and the patient was placed into a walking boot and referred for follow-up with foot and ankle orthopedic surgery. Patient had his scheduled follow-up and was recommended to undergo open reduction of his now subacute 2 through 4 dorsal MTPJ dislocations. Patient presents for second opinion regarding treatment strategies.     Patient currently reports 6 out of 10 pain with ambulation and 2 out of 10 pain at baseline. He has a history of diabetic neuropathy with known decree sensation in his forefoot. Patient has not been taking any anti-inflammatories and continues to work in his neurology practice. He is having difficulty both with standing and with swimming. His foot pain is impairing his activities of daily living and exercise. Patient is otherwise in his usual state of health.     Patient is a current non-smoker, non-insulin-dependent diabetic with no recent HbA1c in our system and is not currently taking any anticoagulation.     09/14/2023: Patient returns for follow-up of his right foot pain. He has been weightbearing as tolerated in a walking boot. Patient reports improved pain with ambulation in the walking boot. He presents with his wife for discussion regarding management of his injury. Patient continues to report moderate to severe pain in the plantar surface of his foot. This severely limits his walking without the boot. He has RLE edema but does not report any associated numbness, tingling.    10/26/2023: Patient returns s/p open reduction 2/3/4 MTPJ with pinning, second Weil osteotomy  and PIPJ arthrodesis from 10/19/2023.  Patient has been compliant with nonweightbearing restrictions utilizing crutches.  He reports minimal pain at this time, 2 out of 10.  He has been taking aspirin for DVT prophylaxis.  He has not noticed any draining from his wounds.  He does report some burning pain.    11/09/2023: Patient returns s/p open reduction R 2/3/4 MTA with pinning of the second Weil osteotomy and PIPJ arthrodesis from 10/19/2023.  Patient has been compliant with nonweightbearing restrictions utilizing crutches.  He continues to report 2 out of 10 pain.  He acknowledges that he has not been elevating his leg.  He has had interval improvement in his burning pain.     Past Medical History  Past Medical History:   Diagnosis Date    Pacemaker     medtronic       Surgical History  Recent Surgeries in Orthopaedic Surgery            No cases to display             Social History  Social History     Socioeconomic History    Marital status:      Spouse name: None    Number of children: None    Years of education: None    Highest education level: None   Occupational History    None   Tobacco Use    Smoking status: Never    Smokeless tobacco: Never   Substance and Sexual Activity    Alcohol use: Defer    Drug use: Never    Sexual activity: None   Other Topics Concern    None   Social History Narrative    None     Social Determinants of Health     Financial Resource Strain: Not on file   Food Insecurity: Not on file   Transportation Needs: Not on file   Physical Activity: Not on file   Stress: Not on file   Social Connections: Not on file   Intimate Partner Violence: Not on file   Housing Stability: Not on file       Family History  No family history on file.     Allergies  No Known Allergies    Review of Systems  REVIEW OF SYSTEMS  Constitutional: no unplanned weight loss  Psychiatric: no suicidal ideation  ENT: no vision changes, no sinus problems  Pulmonary: no shortness of breath during office visit  today  Lymphatic: no enlarged lymph nodes  Cardiovascular: no chest pain or shortness of breath during office visit today  Gastrointestinal: no stomach problems  Genitourinary: no dysuria   Skin: no rashes  Endocrine: no thyroid problems  Neurological: no headache, no numbness  Hematological: no easy bruising  Musculoskeletal: Right foot pain     Physical Exam  PHYSICAL EXAMINATION  Constitutional Exam: well developed and well nourished  Psychiatric Exam: alert and oriented, appropriate mood and behavior  Eye Exam: EOMI  Pulmonary Exam: breathing non-labored, no apparent distress  Lymphatic exam: no appreciable lymphadenopathy in the lower extremities  Cardiovascular exam: RRR to peripheral palpation, DP pulses 2+, PT 2+, toes are pink with good capillary refill, no pitting edema  Skin exam: no open lesions, rashes, abrasions or ulcerations  Neurological exam: sensation to light touch intact in both lower extremities in peripheral and dermatomal distributions (except for any abnormalities noted in musculoskeletal exam)    Musculoskeletal exam: Right lower extremity examination.  Fiberglas cast removed.  Dorsal foot incisions with skin edges well approximated and suture line intact.  There is minimal yasmine-incisional erythema, nontender to palpation.  There is no drainage or expressible drainage.  2 through 4 pins intact with Jurgan balls, pins adjusted to remove pressure from Jurgan balls on toe pulp.  No pin site drainage. Patient has sensation intact light touch grossly in a saphenous, sural, superficial peroneal, deep peroneal and tibial nerve distribution.  He has intact PF/DF/EHL.  He has 2+ DP/PT pulse palpated.     Last Recorded Vitals  There were no vitals taken for this visit.    Laboratory Results  No results found for this or any previous visit (from the past 24 hour(s)).     Radiology Results  X-ray imaging 3 view nonweightbearing right foot reviewed from 11/09/2023 and independently evaluated by me  demonstrates retained 2/3/4 DIPJ/PIPJ/MTPJ K wire fixation with retained alignment.  There is dorsal soft tissue swelling noted.    Assessment/Plan:  68-year-old male NIDDM s/p open reduction R 2/3/4 MTPJ for chronic dislocation with pinning, second Weil osteotomy and PIPJ arthrodesis from 10/19/2023.  In my impression, the patient should continue nonweightbearing in his right lower extremity in a short leg fiberglass cast with an extended toe plate to protect the pins.  I removed the patient's sutures today and applied a compressive dressing.  I have again encouraged him to elevate his extremity above the level of of his heart for both pain and swelling control.  He should continue on 325 ASA p.o. twice daily for DVT prophylaxis.  I will plan to see the patient back in approximately 1 week for a repeat clinical and radiographic evaluation.  I again reviewed with the patient that I would prefer to maintain his pins for 6 weeks, however if he notices any pin site drainage or loosening that we may need to remove the pins sooner.  Upon return, patient would not require further imaging.    Yogi Melo MD, GISELLE  Department of Orthopaedic Surgery  [facility]    The diagnosis and treatment plan were reviewed with the patient. All questions were answered. The patient verbalized understanding of the treatment plan. There were no barriers to understanding identified.    Note dictated with [employer] software.  Completed without full type editing and sent to avoid delay.

## 2023-11-16 ENCOUNTER — APPOINTMENT (OUTPATIENT)
Dept: ORTHOPEDIC SURGERY | Facility: CLINIC | Age: 68
End: 2023-11-16
Payer: MEDICARE

## 2023-11-22 ENCOUNTER — APPOINTMENT (OUTPATIENT)
Dept: RADIOLOGY | Facility: CLINIC | Age: 68
End: 2023-11-22
Payer: MEDICARE

## 2023-11-22 ENCOUNTER — ANCILLARY PROCEDURE (OUTPATIENT)
Dept: RADIOLOGY | Facility: CLINIC | Age: 68
End: 2023-11-22
Payer: MEDICARE

## 2023-11-22 ENCOUNTER — OFFICE VISIT (OUTPATIENT)
Dept: ORTHOPEDIC SURGERY | Facility: CLINIC | Age: 68
End: 2023-11-22
Payer: MEDICARE

## 2023-11-22 ENCOUNTER — APPOINTMENT (OUTPATIENT)
Dept: ORTHOPEDIC SURGERY | Facility: CLINIC | Age: 68
End: 2023-11-22
Payer: MEDICARE

## 2023-11-22 DIAGNOSIS — S93.124D DISLOCATION OF METATARSOPHALANGEAL JOINT OF LESSER TOE OF RIGHT FOOT, SUBSEQUENT ENCOUNTER: ICD-10-CM

## 2023-11-22 DIAGNOSIS — S93.124D DISLOCATION OF METATARSOPHALANGEAL JOINT OF LESSER TOE OF RIGHT FOOT, SUBSEQUENT ENCOUNTER: Primary | ICD-10-CM

## 2023-11-22 PROCEDURE — 73630 X-RAY EXAM OF FOOT: CPT | Mod: RT,FY

## 2023-11-22 PROCEDURE — 1036F TOBACCO NON-USER: CPT | Performed by: STUDENT IN AN ORGANIZED HEALTH CARE EDUCATION/TRAINING PROGRAM

## 2023-11-22 PROCEDURE — 1160F RVW MEDS BY RX/DR IN RCRD: CPT | Performed by: STUDENT IN AN ORGANIZED HEALTH CARE EDUCATION/TRAINING PROGRAM

## 2023-11-22 PROCEDURE — 1159F MED LIST DOCD IN RCRD: CPT | Performed by: STUDENT IN AN ORGANIZED HEALTH CARE EDUCATION/TRAINING PROGRAM

## 2023-11-22 PROCEDURE — 73630 X-RAY EXAM OF FOOT: CPT | Mod: RIGHT SIDE | Performed by: RADIOLOGY

## 2023-11-22 PROCEDURE — 1126F AMNT PAIN NOTED NONE PRSNT: CPT | Performed by: STUDENT IN AN ORGANIZED HEALTH CARE EDUCATION/TRAINING PROGRAM

## 2023-11-22 PROCEDURE — 99024 POSTOP FOLLOW-UP VISIT: CPT | Performed by: STUDENT IN AN ORGANIZED HEALTH CARE EDUCATION/TRAINING PROGRAM

## 2023-11-22 ASSESSMENT — PAIN - FUNCTIONAL ASSESSMENT: PAIN_FUNCTIONAL_ASSESSMENT: NO/DENIES PAIN

## 2023-11-22 NOTE — PROGRESS NOTES
ORTHOPAEDIC SURGERY OUTPATIENT PROGRESS NOTE    Chief Complaint:  Right foot pain    History Of Present Illness  Adams Moody Md is a 68 y.o. male 68-year-old male presents for evaluation of right foot pain. Patient states that he sustained an episode of vertigo on 08/06/2023 with what sounds like a hyper dorsiflexion moment to his forefoot. Patient was initially seen in an urgent care setting, where x-rays were obtained and the patient was placed into a walking boot and referred for follow-up with foot and ankle orthopedic surgery. Patient had his scheduled follow-up and was recommended to undergo open reduction of his now subacute 2 through 4 dorsal MTPJ dislocations. Patient presents for second opinion regarding treatment strategies.     Patient currently reports 6 out of 10 pain with ambulation and 2 out of 10 pain at baseline. He has a history of diabetic neuropathy with known decree sensation in his forefoot. Patient has not been taking any anti-inflammatories and continues to work in his neurology practice. He is having difficulty both with standing and with swimming. His foot pain is impairing his activities of daily living and exercise. Patient is otherwise in his usual state of health.     Patient is a current non-smoker, non-insulin-dependent diabetic with no recent HbA1c in our system and is not currently taking any anticoagulation.     09/14/2023: Patient returns for follow-up of his right foot pain. He has been weightbearing as tolerated in a walking boot. Patient reports improved pain with ambulation in the walking boot. He presents with his wife for discussion regarding management of his injury. Patient continues to report moderate to severe pain in the plantar surface of his foot. This severely limits his walking without the boot. He has RLE edema but does not report any associated numbness, tingling.    10/26/2023: Patient returns s/p open reduction 2/3/4 MTPJ with pinning, second Weil osteotomy  and PIPJ arthrodesis from 10/19/2023.  Patient has been compliant with nonweightbearing restrictions utilizing crutches.  He reports minimal pain at this time, 2 out of 10.  He has been taking aspirin for DVT prophylaxis.  He has not noticed any draining from his wounds.  He does report some burning pain.    11/09/2023: Patient returns s/p open reduction R 2/3/4 MTPJ with pinning of the second Weil osteotomy and PIPJ arthrodesis from 10/19/2023.  Patient has been compliant with nonweightbearing restrictions utilizing crutches.  He continues to report 2 out of 10 pain.  He acknowledges that he has not been elevating his leg.  He has had interval improvement in his burning pain.     11/22/2023: Patient returns s/p open reduction R 2/3/4 MTPJ with pinning of the second Weil osteotomy and PIPJ arthrodesis from 10/19/2023.  Patient has continued to be compliant with nonweightbearing restrictions in a short leg fiberglass cast utilizing crutches.  He reports no pain at this time.  He has had interval decrease in his swelling.  He reports no burning pain on this examination.    Past Medical History  Past Medical History:   Diagnosis Date    Pacemaker     medtronic       Surgical History  Recent Surgeries in Orthopaedic Surgery            No cases to display             Social History  Social History     Socioeconomic History    Marital status:      Spouse name: None    Number of children: None    Years of education: None    Highest education level: None   Occupational History    None   Tobacco Use    Smoking status: Never    Smokeless tobacco: Never   Substance and Sexual Activity    Alcohol use: Defer    Drug use: Never    Sexual activity: None   Other Topics Concern    None   Social History Narrative    None     Social Determinants of Health     Financial Resource Strain: Not on file   Food Insecurity: Not on file   Transportation Needs: Not on file   Physical Activity: Not on file   Stress: Not on file   Social  Connections: Not on file   Intimate Partner Violence: Not on file   Housing Stability: Not on file       Family History  No family history on file.     Allergies  No Known Allergies    Review of Systems  REVIEW OF SYSTEMS  Constitutional: no unplanned weight loss  Psychiatric: no suicidal ideation  ENT: no vision changes, no sinus problems  Pulmonary: no shortness of breath during office visit today  Lymphatic: no enlarged lymph nodes  Cardiovascular: no chest pain or shortness of breath during office visit today  Gastrointestinal: no stomach problems  Genitourinary: no dysuria   Skin: no rashes  Endocrine: no thyroid problems  Neurological: no headache, no numbness  Hematological: no easy bruising  Musculoskeletal: Right foot pain     Physical Exam  PHYSICAL EXAMINATION  Constitutional Exam: well developed and well nourished  Psychiatric Exam: alert and oriented, appropriate mood and behavior  Eye Exam: EOMI  Pulmonary Exam: breathing non-labored, no apparent distress  Lymphatic exam: no appreciable lymphadenopathy in the lower extremities  Cardiovascular exam: RRR to peripheral palpation, DP pulses 2+, PT 2+, toes are pink with good capillary refill, no pitting edema  Skin exam: no open lesions, rashes, abrasions or ulcerations  Neurological exam: sensation to light touch intact in both lower extremities in peripheral and dermatomal distributions (except for any abnormalities noted in musculoskeletal exam)    Musculoskeletal exam: Right lower extremity examination.  Compressive dressing removed, dorsal incisions with skin edges well-approximated.  There has been significant decrease in dorsal foot swelling.  There is no yasmine-incisional erythema, induration, fluctuance or drainage to suggest underlying infection.  Pins remain intact, no obvious toe pulp necrosis.  Patient has sensation intact light touch grossly in a saphenous, sural, superficial peroneal, deep peroneal and tibial nerve distribution.  He has intact  PF/DF/EHL.  He has 2+ DP/PT pulse palpated.    Last Recorded Vitals  There were no vitals taken for this visit.    Laboratory Results  No results found for this or any previous visit (from the past 24 hour(s)).     Radiology Results  X-ray imaging 3 view nonweightbearing right foot reviewed from 11/22/2023 and independently evaluated by me demonstrates retained 2/3/4 DIPJ/PIPJ/MTPJ K wire fixation with retained alignment at the MTPJ.  There has been interval valgus deviation of the third toe wire and interval healing about the second Weil osteotomy.  Lateral projection demonstrates maintained reduction.    Assessment/Plan:  68-year-old male NIDDM s/p open reduction R 2/3/4 MTPJ for chronic dislocation with pinning, second Weil osteotomy and PIPJ arthrodesis from 10/19/2023 who has both clinical and radiographic evidence of healing.  I recommend the patient continue nonweightbearing in his right lower extremity in a walking boot for an additional week at which point he may progress to heel weightbearing in the walking boot.  I removed his pins today and placed a soft dressing which may be removed in 48 hours.  He will continue with elevation and ASA for DVT prophylaxis.  I will plan to see the patient back in approximately 2 weeks for repeat clinical evaluation.  Upon return, patient would not require further imaging.    Yogi Melo MD, GISELLE  Department of Orthopaedic Surgery  [facility]    The diagnosis and treatment plan were reviewed with the patient. All questions were answered. The patient verbalized understanding of the treatment plan. There were no barriers to understanding identified.    Note dictated with [employer] software.  Completed without full type editing and sent to avoid delay.

## 2023-11-28 ENCOUNTER — APPOINTMENT (OUTPATIENT)
Dept: ORTHOPEDIC SURGERY | Facility: CLINIC | Age: 68
End: 2023-11-28
Payer: MEDICARE

## 2023-12-05 ENCOUNTER — HOSPITAL ENCOUNTER (OUTPATIENT)
Dept: RADIOLOGY | Facility: HOSPITAL | Age: 68
Discharge: HOME | End: 2023-12-05
Payer: MEDICARE

## 2023-12-05 ENCOUNTER — OFFICE VISIT (OUTPATIENT)
Dept: ORTHOPEDIC SURGERY | Facility: HOSPITAL | Age: 68
End: 2023-12-05
Payer: MEDICARE

## 2023-12-05 DIAGNOSIS — S93.124D DISLOCATION OF METATARSOPHALANGEAL JOINT OF LESSER TOE OF RIGHT FOOT, SUBSEQUENT ENCOUNTER: ICD-10-CM

## 2023-12-05 PROCEDURE — 73630 X-RAY EXAM OF FOOT: CPT | Mod: RT

## 2023-12-05 PROCEDURE — 99024 POSTOP FOLLOW-UP VISIT: CPT | Performed by: STUDENT IN AN ORGANIZED HEALTH CARE EDUCATION/TRAINING PROGRAM

## 2023-12-05 PROCEDURE — 1126F AMNT PAIN NOTED NONE PRSNT: CPT | Performed by: STUDENT IN AN ORGANIZED HEALTH CARE EDUCATION/TRAINING PROGRAM

## 2023-12-05 PROCEDURE — 1159F MED LIST DOCD IN RCRD: CPT | Performed by: STUDENT IN AN ORGANIZED HEALTH CARE EDUCATION/TRAINING PROGRAM

## 2023-12-05 PROCEDURE — 1036F TOBACCO NON-USER: CPT | Performed by: STUDENT IN AN ORGANIZED HEALTH CARE EDUCATION/TRAINING PROGRAM

## 2023-12-05 PROCEDURE — 73630 X-RAY EXAM OF FOOT: CPT | Mod: RIGHT SIDE | Performed by: RADIOLOGY

## 2023-12-05 PROCEDURE — 1160F RVW MEDS BY RX/DR IN RCRD: CPT | Performed by: STUDENT IN AN ORGANIZED HEALTH CARE EDUCATION/TRAINING PROGRAM

## 2023-12-05 NOTE — PROGRESS NOTES
ORTHOPAEDIC SURGERY OUTPATIENT PROGRESS NOTE    Chief Complaint:  Right foot pain    History Of Present Illness  Adams Moody Md is a 68 y.o. male 68-year-old male presents for evaluation of right foot pain. Patient states that he sustained an episode of vertigo on 08/06/2023 with what sounds like a hyper dorsiflexion moment to his forefoot. Patient was initially seen in an urgent care setting, where x-rays were obtained and the patient was placed into a walking boot and referred for follow-up with foot and ankle orthopedic surgery. Patient had his scheduled follow-up and was recommended to undergo open reduction of his now subacute 2 through 4 dorsal MTPJ dislocations. Patient presents for second opinion regarding treatment strategies.     Patient currently reports 6 out of 10 pain with ambulation and 2 out of 10 pain at baseline. He has a history of diabetic neuropathy with known decree sensation in his forefoot. Patient has not been taking any anti-inflammatories and continues to work in his neurology practice. He is having difficulty both with standing and with swimming. His foot pain is impairing his activities of daily living and exercise. Patient is otherwise in his usual state of health.     Patient is a current non-smoker, non-insulin-dependent diabetic with no recent HbA1c in our system and is not currently taking any anticoagulation.     09/14/2023: Patient returns for follow-up of his right foot pain. He has been weightbearing as tolerated in a walking boot. Patient reports improved pain with ambulation in the walking boot. He presents with his wife for discussion regarding management of his injury. Patient continues to report moderate to severe pain in the plantar surface of his foot. This severely limits his walking without the boot. He has RLE edema but does not report any associated numbness, tingling.    10/26/2023: Patient returns s/p open reduction 2/3/4 MTPJ with pinning, second Weil osteotomy  and PIPJ arthrodesis from 10/19/2023.  Patient has been compliant with nonweightbearing restrictions utilizing crutches.  He reports minimal pain at this time, 2 out of 10.  He has been taking aspirin for DVT prophylaxis.  He has not noticed any draining from his wounds.  He does report some burning pain.    11/09/2023: Patient returns s/p open reduction R 2/3/4 MTPJ with pinning of the second Weil osteotomy and PIPJ arthrodesis from 10/19/2023.  Patient has been compliant with nonweightbearing restrictions utilizing crutches.  He continues to report 2 out of 10 pain.  He acknowledges that he has not been elevating his leg.  He has had interval improvement in his burning pain.     11/22/2023: Patient returns s/p open reduction R 2/3/4 MTPJ with pinning of the second Weil osteotomy and PIPJ arthrodesis from 10/19/2023.  Patient has continued to be compliant with nonweightbearing restrictions in a short leg fiberglass cast utilizing crutches.  He reports no pain at this time.  He has had interval decrease in his swelling.  He reports no burning pain on this examination.    12/05/2023: Patient returns s/p open reduction R 2/3/4 MTPJ with pinning of the second Weil osteotomy and PIPJ arthrodesis from 10/19/2023.  Patient has recently transition out of the walking boot as of 2 days ago.  He has had decrease in his swelling but has not yet been able to fit into a regular shoe.  He has been weightbearing in a boot with minimal discomfort as time.  He denies any new onset numbness, tingling or weakness.  He reports that the alignment of his toes has been improved since her last visit.    Past Medical History  Past Medical History:   Diagnosis Date    Pacemaker     medtronic       Surgical History  Recent Surgeries in Orthopaedic Surgery            No cases to display             Social History  Social History     Socioeconomic History    Marital status:      Spouse name: None    Number of children: None    Years of  education: None    Highest education level: None   Occupational History    None   Tobacco Use    Smoking status: Never    Smokeless tobacco: Never   Substance and Sexual Activity    Alcohol use: Defer    Drug use: Never    Sexual activity: None   Other Topics Concern    None   Social History Narrative    None     Social Determinants of Health     Financial Resource Strain: Not on file   Food Insecurity: Not on file   Transportation Needs: Not on file   Physical Activity: Not on file   Stress: Not on file   Social Connections: Not on file   Intimate Partner Violence: Not on file   Housing Stability: Not on file       Family History  No family history on file.     Allergies  No Known Allergies    Review of Systems  REVIEW OF SYSTEMS  Constitutional: no unplanned weight loss  Psychiatric: no suicidal ideation  ENT: no vision changes, no sinus problems  Pulmonary: no shortness of breath during office visit today  Lymphatic: no enlarged lymph nodes  Cardiovascular: no chest pain or shortness of breath during office visit today  Gastrointestinal: no stomach problems  Genitourinary: no dysuria   Skin: no rashes  Endocrine: no thyroid problems  Neurological: no headache, no numbness  Hematological: no easy bruising  Musculoskeletal: Right foot pain     Physical Exam  PHYSICAL EXAMINATION  Constitutional Exam: well developed and well nourished  Psychiatric Exam: alert and oriented, appropriate mood and behavior  Eye Exam: EOMI  Pulmonary Exam: breathing non-labored, no apparent distress  Lymphatic exam: no appreciable lymphadenopathy in the lower extremities  Cardiovascular exam: RRR to peripheral palpation, DP pulses 2+, PT 2+, toes are pink with good capillary refill, no pitting edema  Skin exam: no open lesions, rashes, abrasions or ulcerations  Neurological exam: sensation to light touch intact in both lower extremities in peripheral and dermatomal distributions (except for any abnormalities noted in musculoskeletal  exam)    Musculoskeletal exam: Right lower extremity examination.  Dorsal foot incisions well-healed.  There continues to be improvement in his dorsal foot swelling.  There is no yasmine-incisional erythema, induration, fluctuance or drainage to suggest underlying infection.  There has been improvement in the distal toe pin sites.  Patient has pain-free but expected restricted motion of the second PIPJ.  Pain-free ROM at the 2/3/4 MTPJ with negative Lachman's.  Patient has baseline diminished sensation to light touch in a saphenous, sural, superficial peroneal, deep peroneal and tibial nerve distribution.  He has intact PF/DF/EHL.  He has 2+ DP/PT pulse palpated.      Last Recorded Vitals  There were no vitals taken for this visit.    Laboratory Results  No results found for this or any previous visit (from the past 24 hour(s)).     Radiology Results  X-ray imaging 3 view nonweightbearing right foot reviewed from 11/22/2023 and independently evaluated by me demonstrates retained 2/3/4 DIPJ/PIPJ/MTPJ K wire fixation with retained alignment at the MTPJ.  There has been interval valgus deviation of the third toe wire and interval healing about the second Weil osteotomy.  Lateral projection demonstrates maintained reduction.    X-ray imaging 3 view weightbearing right foot reviewed from 12/05/2023 and independently evaluated by me demonstrates retained 2/3/4 DIPJ/PIPJ/MTPJ alignment.  There has been improvement in the valgus deviation of the third toe and continued interval healing about second Weil osteotomy.  There is slight distraction noted at the second PIPJ arthrodesis site.  Lateral projection demonstrates continued maintained reduction.    Assessment/Plan:  68-year-old male NIDDM s/p open reduction R 2/3/4 MTPJ for chronic dislocation with pinning, second Weil osteotomy and PIPJ arthrodesis from 10/19/2023 who has both clinical and radiographic evidence of healing.  In my impression the patient may continue  weightbearing as tolerated in his right lower extremity.  I counseled the patient that his swelling should continue to improve and as he is able he should transition into a regular shoe.  I discussed that he should continue to be careful with respect to pain with ambulation and to avoid undue pressure on his forefoot but I otherwise have no restrictions for him at this time.  I will plan to see the patient back in approximately 6 weeks for repeat clinical and radiographic evaluation.  Upon return, patient require 3 views weightbearing right foot.    Yogi Melo MD, GISELLE  Department of Orthopaedic Surgery  [facility]    The diagnosis and treatment plan were reviewed with the patient. All questions were answered. The patient verbalized understanding of the treatment plan. There were no barriers to understanding identified.    Note dictated with [employer] software.  Completed without full type editing and sent to avoid delay.

## 2024-01-11 ENCOUNTER — ANCILLARY PROCEDURE (OUTPATIENT)
Dept: RADIOLOGY | Facility: CLINIC | Age: 69
End: 2024-01-11
Payer: MEDICARE

## 2024-01-11 ENCOUNTER — OFFICE VISIT (OUTPATIENT)
Dept: ORTHOPEDIC SURGERY | Facility: CLINIC | Age: 69
End: 2024-01-11
Payer: MEDICARE

## 2024-01-11 DIAGNOSIS — S93.124D DISLOCATION OF METATARSOPHALANGEAL JOINT OF LESSER TOE OF RIGHT FOOT, SUBSEQUENT ENCOUNTER: ICD-10-CM

## 2024-01-11 PROCEDURE — 1159F MED LIST DOCD IN RCRD: CPT | Performed by: STUDENT IN AN ORGANIZED HEALTH CARE EDUCATION/TRAINING PROGRAM

## 2024-01-11 PROCEDURE — 73630 X-RAY EXAM OF FOOT: CPT | Mod: RIGHT SIDE | Performed by: STUDENT IN AN ORGANIZED HEALTH CARE EDUCATION/TRAINING PROGRAM

## 2024-01-11 PROCEDURE — 73630 X-RAY EXAM OF FOOT: CPT | Mod: RT

## 2024-01-11 PROCEDURE — 1036F TOBACCO NON-USER: CPT | Performed by: STUDENT IN AN ORGANIZED HEALTH CARE EDUCATION/TRAINING PROGRAM

## 2024-01-11 PROCEDURE — 99024 POSTOP FOLLOW-UP VISIT: CPT | Performed by: STUDENT IN AN ORGANIZED HEALTH CARE EDUCATION/TRAINING PROGRAM

## 2024-01-11 PROCEDURE — 1160F RVW MEDS BY RX/DR IN RCRD: CPT | Performed by: STUDENT IN AN ORGANIZED HEALTH CARE EDUCATION/TRAINING PROGRAM

## 2024-01-11 PROCEDURE — 1126F AMNT PAIN NOTED NONE PRSNT: CPT | Performed by: STUDENT IN AN ORGANIZED HEALTH CARE EDUCATION/TRAINING PROGRAM

## 2024-01-11 ASSESSMENT — PAIN SCALES - GENERAL: PAINLEVEL_OUTOF10: 3

## 2024-01-11 ASSESSMENT — PAIN - FUNCTIONAL ASSESSMENT: PAIN_FUNCTIONAL_ASSESSMENT: 0-10

## 2024-01-11 ASSESSMENT — PAIN DESCRIPTION - DESCRIPTORS: DESCRIPTORS: SPASM;SHARP

## 2024-01-11 NOTE — PROGRESS NOTES
ORTHOPAEDIC SURGERY OUTPATIENT PROGRESS NOTE    Chief Complaint:  Right foot pain    History Of Present Illness  Adams Moody Md is a 68 y.o. male 68-year-old male presents for evaluation of right foot pain. Patient states that he sustained an episode of vertigo on 08/06/2023 with what sounds like a hyper dorsiflexion moment to his forefoot. Patient was initially seen in an urgent care setting, where x-rays were obtained and the patient was placed into a walking boot and referred for follow-up with foot and ankle orthopedic surgery. Patient had his scheduled follow-up and was recommended to undergo open reduction of his now subacute 2 through 4 dorsal MTPJ dislocations. Patient presents for second opinion regarding treatment strategies.     Patient currently reports 6 out of 10 pain with ambulation and 2 out of 10 pain at baseline. He has a history of diabetic neuropathy with known decree sensation in his forefoot. Patient has not been taking any anti-inflammatories and continues to work in his neurology practice. He is having difficulty both with standing and with swimming. His foot pain is impairing his activities of daily living and exercise. Patient is otherwise in his usual state of health.     Patient is a current non-smoker, non-insulin-dependent diabetic with no recent HbA1c in our system and is not currently taking any anticoagulation.     09/14/2023: Patient returns for follow-up of his right foot pain. He has been weightbearing as tolerated in a walking boot. Patient reports improved pain with ambulation in the walking boot. He presents with his wife for discussion regarding management of his injury. Patient continues to report moderate to severe pain in the plantar surface of his foot. This severely limits his walking without the boot. He has RLE edema but does not report any associated numbness, tingling.    10/26/2023: Patient returns s/p open reduction 2/3/4 MTPJ with pinning, second Weil osteotomy  and PIPJ arthrodesis from 10/19/2023.  Patient has been compliant with nonweightbearing restrictions utilizing crutches.  He reports minimal pain at this time, 2 out of 10.  He has been taking aspirin for DVT prophylaxis.  He has not noticed any draining from his wounds.  He does report some burning pain.    11/09/2023: Patient returns s/p open reduction R 2/3/4 MTPJ with pinning of the second Weil osteotomy and PIPJ arthrodesis from 10/19/2023.  Patient has been compliant with nonweightbearing restrictions utilizing crutches.  He continues to report 2 out of 10 pain.  He acknowledges that he has not been elevating his leg.  He has had interval improvement in his burning pain.     11/22/2023: Patient returns s/p open reduction R 2/3/4 MTPJ with pinning of the second Weil osteotomy and PIPJ arthrodesis from 10/19/2023.  Patient has continued to be compliant with nonweightbearing restrictions in a short leg fiberglass cast utilizing crutches.  He reports no pain at this time.  He has had interval decrease in his swelling.  He reports no burning pain on this examination.    12/05/2023: Patient returns s/p open reduction R 2/3/4 MTPJ with pinning of the second Weil osteotomy and PIPJ arthrodesis from 10/19/2023.  Patient has recently transition out of the walking boot as of 2 days ago.  He has had decrease in his swelling but has not yet been able to fit into a regular shoe.  He has been weightbearing in a boot with minimal discomfort as time.  He denies any new onset numbness, tingling or weakness.  He reports that the alignment of his toes has been improved since her last visit.    01/11/2024: Patient returns s/p open reduction R 2/3/4 MTPJ with pinning of the second Weil osteotomy and PIPJ arthrodesis from 10/19/2023.  Patient has fully transition to weightbearing in boots, he is yet unable to weight-bear in regular shoes due to persistent swelling.  Patient reports that his pain and swelling have decreased over  time.  He has been without new onset numbness, tingling or weakness.    Past Medical History  Past Medical History:   Diagnosis Date    Pacemaker     medtronic       Surgical History  Recent Surgeries in Orthopaedic Surgery            No cases to display             Social History  Social History     Socioeconomic History    Marital status:      Spouse name: Not on file    Number of children: Not on file    Years of education: Not on file    Highest education level: Not on file   Occupational History    Not on file   Tobacco Use    Smoking status: Never    Smokeless tobacco: Never   Substance and Sexual Activity    Alcohol use: Defer    Drug use: Never    Sexual activity: Not on file   Other Topics Concern    Not on file   Social History Narrative    Not on file     Social Determinants of Health     Financial Resource Strain: Not on file   Food Insecurity: Not on file   Transportation Needs: Not on file   Physical Activity: Not on file   Stress: Not on file   Social Connections: Not on file   Intimate Partner Violence: Not on file   Housing Stability: Not on file       Family History  No family history on file.     Allergies  No Known Allergies    Review of Systems  REVIEW OF SYSTEMS  Constitutional: no unplanned weight loss  Psychiatric: no suicidal ideation  ENT: no vision changes, no sinus problems  Pulmonary: no shortness of breath during office visit today  Lymphatic: no enlarged lymph nodes  Cardiovascular: no chest pain or shortness of breath during office visit today  Gastrointestinal: no stomach problems  Genitourinary: no dysuria   Skin: no rashes  Endocrine: no thyroid problems  Neurological: no headache, no numbness  Hematological: no easy bruising  Musculoskeletal: Right foot pain     Physical Exam  PHYSICAL EXAMINATION  Constitutional Exam: well developed and well nourished  Psychiatric Exam: alert and oriented, appropriate mood and behavior  Eye Exam: EOMI  Pulmonary Exam: breathing  non-labored, no apparent distress  Lymphatic exam: no appreciable lymphadenopathy in the lower extremities  Cardiovascular exam: RRR to peripheral palpation, DP pulses 2+, PT 2+, toes are pink with good capillary refill, no pitting edema  Skin exam: no open lesions, rashes, abrasions or ulcerations  Neurological exam: sensation to light touch intact in both lower extremities in peripheral and dermatomal distributions (except for any abnormalities noted in musculoskeletal exam)    Musculoskeletal exam: X-ray imaging 3 view weightbearing right foot reviewed from 01/11/2024 and independently evaluated by me demonstrates right lower extremity examination, dorsal foot incisions well-healed and continued interval improvement in dorsal foot swelling.  Forefoot alignment remains well-maintained.  Patient with pain localizing to the plantar metatarsal heads.  Patient has baseline diminished sensation to light touch in a saphenous, sural, superficial peroneal, deep peroneal and tibial nerve distribution.  He has intact PF/DF/EHL.  He has 2+ DP/PT pulse palpated.      Last Recorded Vitals  There were no vitals taken for this visit.    Laboratory Results  No results found for this or any previous visit (from the past 24 hour(s)).     Radiology Results  X-ray imaging 3 view weightbearing right foot reviewed from 01/11/2024 and independently evaluated by me demonstrates retained alignment of 2/3/4 DIPJ/PIPJ/MTPJ.  There is interval consolidation of second Weil osteotomy, forefoot parabola well-maintained.    Assessment/Plan:  68-year-old male NIDDM s/p open reduction R 2/3/4 MTPJ for chronic dislocation with pinning, second Weil osteotomy and PIPJ arthrodesis from 10/19/2023 who has both clinical and radiographic evidence of healing. In my impression the patient may continue weightbearing as tolerated in his right lower extremity.  I recommend that he utilize compression socks for swelling control.  I discussed the potential for  metatarsal pad versus  with metatarsal head recession and padding to address his plantar foot pain, the patient will defer at this time.  I will plan to see the patient back in approximately 3 months for a repeat clinical and radiographic evaluation.  Upon return, patient require 3 views weightbearing right foot.    Yogi Melo MD, GISELLE  Department of Orthopaedic Surgery  [facility]    The diagnosis and treatment plan were reviewed with the patient. All questions were answered. The patient verbalized understanding of the treatment plan. There were no barriers to understanding identified.    Note dictated with [employer] software.  Completed without full type editing and sent to avoid delay.

## 2024-04-04 ENCOUNTER — LAB (OUTPATIENT)
Dept: LAB | Facility: LAB | Age: 69
End: 2024-04-04
Payer: MEDICARE

## 2024-04-04 ENCOUNTER — OFFICE VISIT (OUTPATIENT)
Dept: PRIMARY CARE | Facility: CLINIC | Age: 69
End: 2024-04-04
Payer: MEDICARE

## 2024-04-04 VITALS
OXYGEN SATURATION: 94 % | SYSTOLIC BLOOD PRESSURE: 155 MMHG | BODY MASS INDEX: 28.84 KG/M2 | HEART RATE: 69 BPM | WEIGHT: 201 LBS | DIASTOLIC BLOOD PRESSURE: 77 MMHG

## 2024-04-04 DIAGNOSIS — E78.5 DYSLIPIDEMIA: ICD-10-CM

## 2024-04-04 DIAGNOSIS — E11.69 TYPE 2 DIABETES MELLITUS WITH OTHER SPECIFIED COMPLICATION, WITHOUT LONG-TERM CURRENT USE OF INSULIN (MULTI): ICD-10-CM

## 2024-04-04 DIAGNOSIS — I44.1 SECOND DEGREE AV BLOCK: ICD-10-CM

## 2024-04-04 DIAGNOSIS — N52.9 ERECTILE DYSFUNCTION, UNSPECIFIED ERECTILE DYSFUNCTION TYPE: ICD-10-CM

## 2024-04-04 DIAGNOSIS — R03.0 ELEVATED BLOOD PRESSURE READING: ICD-10-CM

## 2024-04-04 DIAGNOSIS — I10 ELEVATED BLOOD PRESSURE READING WITH DIAGNOSIS OF HYPERTENSION: ICD-10-CM

## 2024-04-04 DIAGNOSIS — Z11.4 ENCOUNTER FOR SCREENING FOR HIV: ICD-10-CM

## 2024-04-04 DIAGNOSIS — E55.9 MILD VITAMIN D DEFICIENCY: ICD-10-CM

## 2024-04-04 DIAGNOSIS — Z23 IMMUNIZATION DUE: ICD-10-CM

## 2024-04-04 DIAGNOSIS — E11.69 TYPE 2 DIABETES MELLITUS WITH OTHER SPECIFIED COMPLICATION, WITHOUT LONG-TERM CURRENT USE OF INSULIN (MULTI): Primary | ICD-10-CM

## 2024-04-04 DIAGNOSIS — Z12.5 PROSTATE CANCER SCREENING: ICD-10-CM

## 2024-04-04 DIAGNOSIS — Z12.11 COLON CANCER SCREENING: ICD-10-CM

## 2024-04-04 DIAGNOSIS — Z53.20: ICD-10-CM

## 2024-04-04 DIAGNOSIS — H91.90 HEARING LOSS, UNSPECIFIED HEARING LOSS TYPE, UNSPECIFIED LATERALITY: ICD-10-CM

## 2024-04-04 DIAGNOSIS — Z11.59 ENCOUNTER FOR HEPATITIS C SCREENING TEST FOR LOW RISK PATIENT: ICD-10-CM

## 2024-04-04 DIAGNOSIS — Z11.59 NEED FOR HEPATITIS B SCREENING TEST: ICD-10-CM

## 2024-04-04 DIAGNOSIS — Z13.0 SCREENING FOR DEFICIENCY ANEMIA: ICD-10-CM

## 2024-04-04 DIAGNOSIS — N40.0 BENIGN PROSTATIC HYPERPLASIA, UNSPECIFIED WHETHER LOWER URINARY TRACT SYMPTOMS PRESENT: ICD-10-CM

## 2024-04-04 LAB
25(OH)D3 SERPL-MCNC: 25 NG/ML (ref 30–100)
ALBUMIN SERPL BCP-MCNC: 4.3 G/DL (ref 3.4–5)
ALP SERPL-CCNC: 74 U/L (ref 33–136)
ALT SERPL W P-5'-P-CCNC: 22 U/L (ref 10–52)
ANION GAP SERPL CALC-SCNC: 14 MMOL/L (ref 10–20)
AST SERPL W P-5'-P-CCNC: 20 U/L (ref 9–39)
BILIRUB SERPL-MCNC: 0.7 MG/DL (ref 0–1.2)
BUN SERPL-MCNC: 20 MG/DL (ref 6–23)
CALCIUM SERPL-MCNC: 9.9 MG/DL (ref 8.6–10.6)
CHLORIDE SERPL-SCNC: 102 MMOL/L (ref 98–107)
CHOLEST SERPL-MCNC: 156 MG/DL (ref 0–199)
CHOLESTEROL/HDL RATIO: 4.7
CO2 SERPL-SCNC: 29 MMOL/L (ref 21–32)
CREAT SERPL-MCNC: 0.84 MG/DL (ref 0.5–1.3)
EGFRCR SERPLBLD CKD-EPI 2021: >90 ML/MIN/1.73M*2
EST. AVERAGE GLUCOSE BLD GHB EST-MCNC: 169 MG/DL
GLUCOSE SERPL-MCNC: 133 MG/DL (ref 74–99)
HBA1C MFR BLD: 7.5 %
HBV SURFACE AG SERPL QL IA: NONREACTIVE
HCV AB SER QL: NONREACTIVE
HDLC SERPL-MCNC: 33 MG/DL
LDLC SERPL CALC-MCNC: 88 MG/DL
NON HDL CHOLESTEROL: 123 MG/DL (ref 0–149)
POTASSIUM SERPL-SCNC: 4.5 MMOL/L (ref 3.5–5.3)
PROT SERPL-MCNC: 7 G/DL (ref 6.4–8.2)
SODIUM SERPL-SCNC: 140 MMOL/L (ref 136–145)
T4 FREE SERPL-MCNC: 1.13 NG/DL (ref 0.78–1.48)
TRIGL SERPL-MCNC: 174 MG/DL (ref 0–149)
TSH SERPL-ACNC: 3.99 MIU/L (ref 0.44–3.98)
VLDL: 35 MG/DL (ref 0–40)

## 2024-04-04 PROCEDURE — 1159F MED LIST DOCD IN RCRD: CPT | Performed by: STUDENT IN AN ORGANIZED HEALTH CARE EDUCATION/TRAINING PROGRAM

## 2024-04-04 PROCEDURE — 3051F HG A1C>EQUAL 7.0%<8.0%: CPT | Performed by: STUDENT IN AN ORGANIZED HEALTH CARE EDUCATION/TRAINING PROGRAM

## 2024-04-04 PROCEDURE — 80053 COMPREHEN METABOLIC PANEL: CPT

## 2024-04-04 PROCEDURE — 82570 ASSAY OF URINE CREATININE: CPT

## 2024-04-04 PROCEDURE — 3078F DIAST BP <80 MM HG: CPT | Performed by: STUDENT IN AN ORGANIZED HEALTH CARE EDUCATION/TRAINING PROGRAM

## 2024-04-04 PROCEDURE — 1160F RVW MEDS BY RX/DR IN RCRD: CPT | Performed by: STUDENT IN AN ORGANIZED HEALTH CARE EDUCATION/TRAINING PROGRAM

## 2024-04-04 PROCEDURE — 86803 HEPATITIS C AB TEST: CPT

## 2024-04-04 PROCEDURE — 85025 COMPLETE CBC W/AUTO DIFF WBC: CPT

## 2024-04-04 PROCEDURE — 87389 HIV-1 AG W/HIV-1&-2 AB AG IA: CPT

## 2024-04-04 PROCEDURE — 82306 VITAMIN D 25 HYDROXY: CPT

## 2024-04-04 PROCEDURE — 36415 COLL VENOUS BLD VENIPUNCTURE: CPT

## 2024-04-04 PROCEDURE — 3061F NEG MICROALBUMINURIA REV: CPT | Performed by: STUDENT IN AN ORGANIZED HEALTH CARE EDUCATION/TRAINING PROGRAM

## 2024-04-04 PROCEDURE — 3048F LDL-C <100 MG/DL: CPT | Performed by: STUDENT IN AN ORGANIZED HEALTH CARE EDUCATION/TRAINING PROGRAM

## 2024-04-04 PROCEDURE — 84439 ASSAY OF FREE THYROXINE: CPT

## 2024-04-04 PROCEDURE — G0009 ADMIN PNEUMOCOCCAL VACCINE: HCPCS | Performed by: STUDENT IN AN ORGANIZED HEALTH CARE EDUCATION/TRAINING PROGRAM

## 2024-04-04 PROCEDURE — G0103 PSA SCREENING: HCPCS

## 2024-04-04 PROCEDURE — 84443 ASSAY THYROID STIM HORMONE: CPT

## 2024-04-04 PROCEDURE — 3077F SYST BP >= 140 MM HG: CPT | Performed by: STUDENT IN AN ORGANIZED HEALTH CARE EDUCATION/TRAINING PROGRAM

## 2024-04-04 PROCEDURE — 82043 UR ALBUMIN QUANTITATIVE: CPT

## 2024-04-04 PROCEDURE — 84154 ASSAY OF PSA FREE: CPT

## 2024-04-04 PROCEDURE — 3008F BODY MASS INDEX DOCD: CPT | Performed by: STUDENT IN AN ORGANIZED HEALTH CARE EDUCATION/TRAINING PROGRAM

## 2024-04-04 PROCEDURE — 80061 LIPID PANEL: CPT

## 2024-04-04 PROCEDURE — G2211 COMPLEX E/M VISIT ADD ON: HCPCS | Performed by: STUDENT IN AN ORGANIZED HEALTH CARE EDUCATION/TRAINING PROGRAM

## 2024-04-04 PROCEDURE — 83036 HEMOGLOBIN GLYCOSYLATED A1C: CPT

## 2024-04-04 PROCEDURE — 87340 HEPATITIS B SURFACE AG IA: CPT

## 2024-04-04 PROCEDURE — 90677 PCV20 VACCINE IM: CPT | Performed by: STUDENT IN AN ORGANIZED HEALTH CARE EDUCATION/TRAINING PROGRAM

## 2024-04-04 PROCEDURE — 99204 OFFICE O/P NEW MOD 45 MIN: CPT | Performed by: STUDENT IN AN ORGANIZED HEALTH CARE EDUCATION/TRAINING PROGRAM

## 2024-04-04 RX ORDER — TADALAFIL 5 MG/1
5 TABLET ORAL DAILY PRN
Qty: 90 TABLET | Refills: 3 | Status: SHIPPED | OUTPATIENT
Start: 2024-04-04 | End: 2025-03-30

## 2024-04-04 RX ORDER — METFORMIN HYDROCHLORIDE 500 MG/1
TABLET ORAL
Qty: 270 TABLET | Refills: 3 | Status: SHIPPED | OUTPATIENT
Start: 2024-04-04

## 2024-04-04 NOTE — PROGRESS NOTES
Adams Moody Md is a 68 y.o. male [place] [facility] [place] for routine care, as well as for management of the following chronic medical conditions: HTN, Second Degree AV Block (s/p PPM), T2DM, R Foot Injury (s/p Open Reduction 10/2023). Patient presents today to establish care.     #HTN  - around 150/78 on manual repeat   [  ] home BP monitoring  [  ] pending persistently elevation above goal, start ARB at follow up visit     #Second Degree AV Block ([facility]/p PPM at [facility])  - syncope with exertion, thus symptomatic   - seen by Dr. Virgen/Dr. Pace  [  ] EP referral     #T2DM   - Metformin as lone agent (500mg in AM, 1000mg PM)  - BMP from 09/2023 with glucose 177, reassuring renal function  [x] labs today: 7.5%  [x] urine albumin: normal   [  ] start Jardiance: 10mg once daily x1 month, repeat labs, then increase to 25mg pending stable renal function   [  ] normal cholesterol, not on statin currently (LDL 88), CAC scoring to further characterize considering T2DM status     #R Foot Injury s/p Open Reduction R 2/3/4 MTPJ with pinning of second Weil osteotomy and PIPJ arthrodesis (10/19/2023)   - clinical and radiographic healing at time of last visit   - WBAT of RLE  - compression stockings advised   - Dr. Melo had discussed the potential for metatarsal pad versus  with metatarsal head recession and padding to address his plantar foot pain, the patient will defer at this time   [  ] follow up in 3 months, due around 04/2024     Past Medical History: as above   Past Medical History:   Diagnosis Date    Pacemaker     medtronic     Subspecialty Medical Care: Ortho, Optho, EP, Audiology     Past Surgical History: orthopedic surgery as above, PPM   No past surgical history on file.    Medications:  Current Outpatient Medications:     metFORMIN (Glucophage) 500 mg tablet, Take 1 tablet (500 mg) by mouth once daily with breakfast., Disp: , Rfl:   Pharmacy: [facility]  [place])     Allergies: NKDA   No Known Allergies    Immunizations:   - Flu UTD 2023   - COVID vaccinated  - RSV: recommended   - last Tdap unknown   - PCV-20: given today   - Shingrix: recommended    Family History:   No family history on file.    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)   Activities: swimming   Drug Use: non-smoker   Diet: T2DM conscious diet   Depression/Anxiety:   Sexuality/Contraception/Menstrual History:    Sleep:     Patient Information:  Health Insurance: medicare   Transportation: [employer]   Healthcare POA/Guardian: wife, emergency contact   Contact Information:     Visit Vitals  /77   Pulse 69   Wt 91.2 kg (201 lb)   SpO2 94%   BMI 28.84 kg/m²   Smoking Status Never   BSA 2.12 m²      PHYSICAL EXAM:   General: well appearing, NAD, pleasant and engaged in encounter    HEENT: NCAT, MMM  CV: RRR, no m/r/g  PULM: CTAB, non-labored respirations   ABD: soft, NT, ND  : no suprapubic tenderness   EXT: WWP, no significant edema   SKIN: no rashes noted   NEURO: A&Ox4, symmetric facies, no gross motor or sensory deficits, normal gait  PSYCH: pleasant mood, appropriate affect     Assessment/Plan    Adams Moody Md is a 68 y.o. male seen in Clinic at [facility] [place]. Steven Horton on 04/04/24 for routine care, as well as for management of the following chronic medical conditions: HTN, Second Degree AV Block (s/p PPM), T2DM, R Foot Injury (s/p Open Reduction 10/2023). Patient presents today to establish care.     #HTN  - around 150/78 on manual repeat   [  ] home BP monitoring  [  ] pending persistently elevation above goal, start ARB at follow up visit     #Second Degree AV Block (s/p PPM at [facility])  - syncope with exertion, thus symptomatic   - seen by   Param/Dr. Pace  [  ] EP referral     #T2DM   - Metformin as lone agent (500mg in AM, 1000mg PM)  - BMP from 09/2023 with glucose 177, reassuring renal function  [x] labs today: 7.5%  [x] urine albumin: normal   [  ] start Jardiance: 10mg once daily x1 month, repeat labs, then increase to 25mg pending stable renal function   [  ] normal cholesterol, not on statin currently (LDL 88), CAC scoring to further characterize considering T2DM status     #R Foot Injury s/p Open Reduction R 2/3/4 MTPJ with pinning of second Weil osteotomy and PIPJ arthrodesis (10/19/2023)   - clinical and radiographic healing at time of last visit   - WBAT of RLE  - compression stockings advised   - Dr. Melo had discussed the potential for metatarsal pad versus  with metatarsal head recession and padding to address his plantar foot pain, the patient will defer at this time   [  ] follow up in 3 months, due around 04/2024     #Health Maintenance    Cancer Screening  - Colorectal Cancer Screening: COLOGUARD   - Lung Cancer Screening: non-smoker   - Prostate Cancer Screening: reassuring 04/2024     Laboratory Screening  - Lipid Screen: labs today   - ASCVD Score: T2DM; CAC scoring also ordered  - A1C, glucose screen: 7.5%  - STI, HIV, Hep B screen: negative 04/2024  - Hep C screen: negative 04/2024     Imaging Screening  - AAA screening: non-smoker     Immunizations:   - Flu UTD 2023   - COVID vaccinated  - RSV: recommended   - last Tdap unknown   - PCV-20: given today   - Shingrix: recommended    Other Screening  - Health Literacy Assessment: MD helen   - Depression screen:   - Home safety/partner violence screen: no concerns   - Hearing/Vision screens: optho referral, audiology referral   - Alcohol/tobacco/drug use screen: non-smoker   - Healthcare POA/Advanced Directives: wife     Referrals: labs, home BP monitoring, CAC scoring, DORINA EDUARDO, Optho, Audiology referrals, PCV-20, new start Jardiance     Return to clinic in 2-3  months for follow-up.    Patient Discussion:    Please call back the office with any questions at [phone]. In the case of an emergency, please call 911 or go to the nearest Emergency Department.      Steven Horton MD  Internal Medicine-Pediatrics  [facility] [address] [place] [address], [address]  P: [phone], F: [phone]    Incubation End Time: 10:20am

## 2024-04-04 NOTE — PATIENT INSTRUCTIONS
Thank you for coming in today!    Labs--blood and urine--in [address] today    Home blood pressure monitoring    CT Calcium Scoring   Call [phone] to schedule    EP referral to Dr. Bacon  [phone]    COLOGUARD ordered    Prevnar 20 vaccine today  Shingrix through your pharmacy     Audiology and Ophthalmology (Dr. Pruett) referrals  Call [phone] to schedule     Follow up in 2-3 months!    Dr. VOSS

## 2024-04-05 LAB
BASOPHILS # BLD AUTO: 0.03 X10*3/UL (ref 0–0.1)
BASOPHILS NFR BLD AUTO: 0.5 %
CREAT UR-MCNC: 109.8 MG/DL (ref 20–370)
EOSINOPHIL # BLD AUTO: 0.13 X10*3/UL (ref 0–0.7)
EOSINOPHIL NFR BLD AUTO: 2.1 %
ERYTHROCYTE [DISTWIDTH] IN BLOOD BY AUTOMATED COUNT: 12.3 % (ref 11.5–14.5)
HCT VFR BLD AUTO: 46.6 % (ref 41–52)
HGB BLD-MCNC: 15.4 G/DL (ref 13.5–17.5)
HIV 1+2 AB+HIV1 P24 AG SERPL QL IA: NONREACTIVE
IMM GRANULOCYTES # BLD AUTO: 0.01 X10*3/UL (ref 0–0.7)
IMM GRANULOCYTES NFR BLD AUTO: 0.2 % (ref 0–0.9)
LYMPHOCYTES # BLD AUTO: 1.39 X10*3/UL (ref 1.2–4.8)
LYMPHOCYTES NFR BLD AUTO: 22.7 %
MCH RBC QN AUTO: 28.8 PG (ref 26–34)
MCHC RBC AUTO-ENTMCNC: 33 G/DL (ref 32–36)
MCV RBC AUTO: 87 FL (ref 80–100)
MICROALBUMIN UR-MCNC: 17.1 MG/L
MICROALBUMIN/CREAT UR: 15.6 UG/MG CREAT
MONOCYTES # BLD AUTO: 0.55 X10*3/UL (ref 0.1–1)
MONOCYTES NFR BLD AUTO: 9 %
NEUTROPHILS # BLD AUTO: 4 X10*3/UL (ref 1.2–7.7)
NEUTROPHILS NFR BLD AUTO: 65.5 %
NRBC BLD-RTO: 0 /100 WBCS (ref 0–0)
PLATELET # BLD AUTO: 195 X10*3/UL (ref 150–450)
RBC # BLD AUTO: 5.34 X10*6/UL (ref 4.5–5.9)
WBC # BLD AUTO: 6.1 X10*3/UL (ref 4.4–11.3)

## 2024-04-06 LAB
PSA FREE MFR SERPL: 30 %
PSA FREE SERPL-MCNC: 0.3 NG/ML
PSA SERPL IA-MCNC: 1 NG/ML (ref 0–4)

## 2024-04-18 LAB — NONINV COLON CA DNA+OCC BLD SCRN STL QL: NEGATIVE

## 2024-06-18 PROBLEM — I44.2 COMPLETE HEART BLOCK (MULTI): Status: ACTIVE | Noted: 2024-06-18

## 2024-06-19 ENCOUNTER — HOSPITAL ENCOUNTER (OUTPATIENT)
Dept: CARDIOLOGY | Facility: CLINIC | Age: 69
Discharge: HOME | End: 2024-06-19
Payer: MEDICARE

## 2024-06-19 ENCOUNTER — OFFICE VISIT (OUTPATIENT)
Dept: CARDIOLOGY | Facility: CLINIC | Age: 69
End: 2024-06-19
Payer: MEDICARE

## 2024-06-19 VITALS
BODY MASS INDEX: 28.63 KG/M2 | WEIGHT: 200 LBS | DIASTOLIC BLOOD PRESSURE: 80 MMHG | HEIGHT: 70 IN | HEART RATE: 77 BPM | OXYGEN SATURATION: 90 % | SYSTOLIC BLOOD PRESSURE: 148 MMHG

## 2024-06-19 DIAGNOSIS — I42.9 CARDIOMYOPATHY, UNSPECIFIED TYPE (MULTI): ICD-10-CM

## 2024-06-19 DIAGNOSIS — I44.1 SECOND DEGREE AV BLOCK: ICD-10-CM

## 2024-06-19 DIAGNOSIS — I44.2 ATRIOVENTRICULAR BLOCK, COMPLETE (MULTI): ICD-10-CM

## 2024-06-19 DIAGNOSIS — Z95.0 PACEMAKER: Primary | ICD-10-CM

## 2024-06-19 DIAGNOSIS — Z95.0 PACEMAKER: ICD-10-CM

## 2024-06-19 DIAGNOSIS — T82.110A PACEMAKER LEAD MALFUNCTION, INITIAL ENCOUNTER: Primary | ICD-10-CM

## 2024-06-19 LAB
ATRIAL RATE: 68 BPM
BODY SURFACE AREA: 2.12 M2
P AXIS: 3 DEGREES
P OFFSET: 169 MS
P ONSET: 113 MS
PR INTERVAL: 176 MS
Q ONSET: 201 MS
QRS COUNT: 11 BEATS
QRS DURATION: 136 MS
QT INTERVAL: 416 MS
QTC CALCULATION(BAZETT): 442 MS
QTC FREDERICIA: 434 MS
R AXIS: 254 DEGREES
T AXIS: 64 DEGREES
T OFFSET: 409 MS
VENTRICULAR RATE: 68 BPM

## 2024-06-19 PROCEDURE — 1126F AMNT PAIN NOTED NONE PRSNT: CPT | Performed by: INTERNAL MEDICINE

## 2024-06-19 PROCEDURE — 3048F LDL-C <100 MG/DL: CPT | Performed by: INTERNAL MEDICINE

## 2024-06-19 PROCEDURE — 3008F BODY MASS INDEX DOCD: CPT | Performed by: INTERNAL MEDICINE

## 2024-06-19 PROCEDURE — 3079F DIAST BP 80-89 MM HG: CPT | Performed by: INTERNAL MEDICINE

## 2024-06-19 PROCEDURE — 93281 PM DEVICE PROGR EVAL MULTI: CPT

## 2024-06-19 PROCEDURE — 93005 ELECTROCARDIOGRAM TRACING: CPT | Performed by: INTERNAL MEDICINE

## 2024-06-19 PROCEDURE — 3061F NEG MICROALBUMINURIA REV: CPT | Performed by: INTERNAL MEDICINE

## 2024-06-19 PROCEDURE — 99214 OFFICE O/P EST MOD 30 MIN: CPT | Performed by: INTERNAL MEDICINE

## 2024-06-19 PROCEDURE — 3051F HG A1C>EQUAL 7.0%<8.0%: CPT | Performed by: INTERNAL MEDICINE

## 2024-06-19 PROCEDURE — 3077F SYST BP >= 140 MM HG: CPT | Performed by: INTERNAL MEDICINE

## 2024-06-19 PROCEDURE — 1159F MED LIST DOCD IN RCRD: CPT | Performed by: INTERNAL MEDICINE

## 2024-06-19 PROCEDURE — 1036F TOBACCO NON-USER: CPT | Performed by: INTERNAL MEDICINE

## 2024-06-19 RX ORDER — LORATADINE 10 MG/1
10 TABLET ORAL AS NEEDED
COMMUNITY

## 2024-06-19 RX ORDER — ASPIRIN 81 MG/1
81 TABLET ORAL DAILY
COMMUNITY

## 2024-06-19 ASSESSMENT — ENCOUNTER SYMPTOMS
DEPRESSION: 0
OCCASIONAL FEELINGS OF UNSTEADINESS: 0
LOSS OF SENSATION IN FEET: 1

## 2024-06-19 ASSESSMENT — PATIENT HEALTH QUESTIONNAIRE - PHQ9
1. LITTLE INTEREST OR PLEASURE IN DOING THINGS: NOT AT ALL
2. FEELING DOWN, DEPRESSED OR HOPELESS: NOT AT ALL
SUM OF ALL RESPONSES TO PHQ9 QUESTIONS 1 AND 2: 0

## 2024-06-19 ASSESSMENT — PAIN SCALES - GENERAL: PAINLEVEL: 0-NO PAIN

## 2024-06-19 NOTE — PROGRESS NOTES
Referred by Steven Arellano MD  for pacemaker follow up, h/o AV block high grade.         History Of Present Illness:    Adams Moody Md is a 69 y.o. year old male patient with HTN, Type 2 DM, and high grade AV block s/p CRT P ( 2018 [facility]) presenting for device management.     Relevant EP Hx:    - Presented with exertional symptoms of pre-syncope / lightheadedness and noted to have infra - Hisian disease on EP study    - Underwent CRT P ( ? Normal EF ) at [facility] in 2018. Notes reviewed and indication for CRT not clear except that high burden of RV pacing was expected.   - No consistent device clinic follow up   - On Oct 2023, he underwent orthopedic surgery of his right foot for which his device was interrogated    - Noted to have high RV threshold and also PURNIMA status within 11 months but had [facility] as following clinic    In October he did have a fall - which he attributed to vertigo but which seemed extreme and was associated with loss of postural tone   He was thus referred by his PCP to establish care at  for his device    Past Medical History:  Past Medical History:   Diagnosis Date    Pacemaker     medtronic       Past Surgical History:  Pacemaker 2018      Social History:  Cigarettes: None  ETOH: None  Drugs: None  Exercise: Swimming  Occ: Neurologist  Marital status: Lives with wife    Family History:  No family history of sudden death / premature CAD     Allergies:  No Known Allergies     Outpatient Medications:  Current Outpatient Medications   Medication Instructions    empagliflozin (JARDIANCE) 10 mg, oral, Daily    empagliflozin (JARDIANCE) 25 mg, oral, Daily, Start AFTER taking 10mg dosing for one month.    metFORMIN (Glucophage) 500 mg tablet Take 1 tablet (500 mg) by mouth once daily with breakfast AND 2 tablets (1,000 mg) once daily in the evening. Take with meals.    tadalafil (CIALIS) 5 mg, oral, Daily PRN         Last Recorded Vitals:      10/19/2023    10:58 AM 10/19/2023    11:08 AM  "10/19/2023    11:16 AM 10/19/2023    11:26 AM 10/19/2023    11:36 AM 10/19/2023    12:05 PM 4/4/2024     1:47 PM   Vitals   Systolic 112 123 132 131 134 145 155   Diastolic 91 78 84 83 84 64 77   Heart Rate 67 64 64 64 63 65 69   Temp 36 °C (96.8 °F) 36 °C (96.8 °F) 36.1 °C (97 °F)  36.6 °C (97.9 °F) 36 °C (96.8 °F)    Resp 18 18 18 18 16 15    Weight (lb)       201   BMI       28.84 kg/m2   BSA (m2)       2.12 m2   Visit Report       Report        Physical Exam:  Physical Exam  Vitals and nursing note reviewed.   HENT:      Head: Normocephalic.      Nose: Nose normal.      Mouth/Throat:      Mouth: Mucous membranes are moist.   Eyes:      Pupils: Pupils are equal, round, and reactive to light.   Cardiovascular:      Rate and Rhythm: Normal rate.      Pulses: Normal pulses.   Pulmonary:      Effort: Pulmonary effort is normal.   Chest:          Comments: Well-healed   Abdominal:      Palpations: Abdomen is soft.   Musculoskeletal:         General: Normal range of motion.      Cervical back: Normal range of motion.   Skin:     General: Skin is warm.      Capillary Refill: Capillary refill takes less than 2 seconds.   Neurological:      General: No focal deficit present.      Mental Status: He is alert.   Psychiatric:         Mood and Affect: Mood normal.          Last Cardiology Tests:  ECG:  No results found for this or any previous visit from the past 1095 days.  Atrial sensed - ventricular paced  ms + QRSd 136 ms + Qtc 440 ms     Echo:  No results found for this or any previous visit from the past 1095 days.        LV Ejection Fraction:  No results found for: \"EF\"    CTA coronary 2018 [facility] w/normal origin of coronary arteries. No significant atherosclerotic change or stenotic disease in left main, LCx. Severely calcified plaque in pLAD w/25-50% stenosis and mild plaque with <25% stenosis in RCA.             Lab review: I have Chemistry CMP:   Lab Results   Component Value Date    ALBUMIN 4.3 04/04/2024    " CALCIUM 9.9 04/04/2024    CO2 29 04/04/2024    CREATININE 0.84 04/04/2024    GLUCOSE 133 (H) 04/04/2024    BILITOT 0.7 04/04/2024    PROT 7.0 04/04/2024    ALT 22 04/04/2024    AST 20 04/04/2024    ALKPHOS 74 04/04/2024   , Chemistry BMP   Lab Results   Component Value Date    GLUCOSE 133 (H) 04/04/2024    CALCIUM 9.9 04/04/2024    CO2 29 04/04/2024    CREATININE 0.84 04/04/2024   , and CBC:  Lab Results   Component Value Date    WBC 6.1 04/04/2024    RBC 5.34 04/04/2024    HGB 15.4 04/04/2024    HCT 46.6 04/04/2024    MCV 87 04/04/2024    MCH 28.8 04/04/2024    MCHC 33.0 04/04/2024    RDW 12.3 04/04/2024    NRBC 0.0 04/04/2024       Assessment/Plan   Adams Moody Md is a 69 y.o. year old male patient with HTN, Type 2 DM, and high grade AV block s/p CRT P ( 2018 [facility]) presenting for device management.     # High grade AV block s/p Medtronic CRT P at [facility] in 2018 ( unclear LVEF during implant), dependent on most recent check  Noted high RV thresholds ( 2.25 V in Oct 2023 and he is pacing at 5 V for safety margin), all other leads with stable parameters  Time to PURNIMA < 1 month    Recommendations:    - Clearly the RV lead has a trend towards a higher threshold and it would be ideal to promote better battery longevity and in dependent patients    - We had a discussion on the best step forward and agreed that laser lead extraction of the RV lead with re-implant is the best course given his relatively young age ( and thus prolonged periods of pacing ) and no major risk factors precluding a safe extraction procedure. We will refer to  to discuss regarding the same.    - We will obtain an ECHO to understand EF and also to decide if he actually needs the CS lead ( EF was reportedly normal at implant)    - We will also obtain a CXR to assess lead positions     Daniel Flores MD  Electrophysiology Fellow    Samantha Bacon MD

## 2024-06-19 NOTE — PATIENT INSTRUCTIONS
It was nice to meet you today!    The pacemaker was implanted in 2018 for recognized symptomatic high grade AV block.  The pacemaker was checked today and it is estimated that you have less than one month of battery longevity until elective replacement can be scheduled.     Please schedule a return pacemaker check in one month.  Have your CXR done today.  Schedule an echocardiogram.  Schedule a new patent visit with Dr Gianfranco Bourne for lead extraction consultation.

## 2024-06-20 ENCOUNTER — HOSPITAL ENCOUNTER (OUTPATIENT)
Dept: RADIOLOGY | Facility: CLINIC | Age: 69
Discharge: HOME | End: 2024-06-20
Payer: MEDICARE

## 2024-06-20 DIAGNOSIS — I44.2 ATRIOVENTRICULAR BLOCK, COMPLETE (MULTI): ICD-10-CM

## 2024-06-20 DIAGNOSIS — T82.110A PACEMAKER LEAD MALFUNCTION, INITIAL ENCOUNTER: ICD-10-CM

## 2024-06-20 PROCEDURE — 71046 X-RAY EXAM CHEST 2 VIEWS: CPT

## 2024-06-20 PROCEDURE — 71046 X-RAY EXAM CHEST 2 VIEWS: CPT | Performed by: RADIOLOGY

## 2024-06-25 ENCOUNTER — OFFICE VISIT (OUTPATIENT)
Dept: CARDIOLOGY | Facility: CLINIC | Age: 69
End: 2024-06-25
Payer: MEDICARE

## 2024-06-25 VITALS
DIASTOLIC BLOOD PRESSURE: 76 MMHG | HEART RATE: 63 BPM | WEIGHT: 198 LBS | SYSTOLIC BLOOD PRESSURE: 121 MMHG | OXYGEN SATURATION: 96 % | BODY MASS INDEX: 28.41 KG/M2

## 2024-06-25 DIAGNOSIS — I42.9 CARDIOMYOPATHY, UNSPECIFIED TYPE (MULTI): ICD-10-CM

## 2024-06-25 DIAGNOSIS — I44.2 ATRIOVENTRICULAR BLOCK, COMPLETE (MULTI): ICD-10-CM

## 2024-06-25 PROCEDURE — 93005 ELECTROCARDIOGRAM TRACING: CPT | Performed by: INTERNAL MEDICINE

## 2024-06-25 PROCEDURE — 3061F NEG MICROALBUMINURIA REV: CPT | Performed by: INTERNAL MEDICINE

## 2024-06-25 PROCEDURE — 99214 OFFICE O/P EST MOD 30 MIN: CPT | Performed by: INTERNAL MEDICINE

## 2024-06-25 PROCEDURE — 1159F MED LIST DOCD IN RCRD: CPT | Performed by: INTERNAL MEDICINE

## 2024-06-25 PROCEDURE — 3074F SYST BP LT 130 MM HG: CPT | Performed by: INTERNAL MEDICINE

## 2024-06-25 PROCEDURE — 3051F HG A1C>EQUAL 7.0%<8.0%: CPT | Performed by: INTERNAL MEDICINE

## 2024-06-25 PROCEDURE — 3008F BODY MASS INDEX DOCD: CPT | Performed by: INTERNAL MEDICINE

## 2024-06-25 PROCEDURE — 3048F LDL-C <100 MG/DL: CPT | Performed by: INTERNAL MEDICINE

## 2024-06-25 PROCEDURE — 3078F DIAST BP <80 MM HG: CPT | Performed by: INTERNAL MEDICINE

## 2024-06-25 ASSESSMENT — COLUMBIA-SUICIDE SEVERITY RATING SCALE - C-SSRS
1. IN THE PAST MONTH, HAVE YOU WISHED YOU WERE DEAD OR WISHED YOU COULD GO TO SLEEP AND NOT WAKE UP?: NO
6. HAVE YOU EVER DONE ANYTHING, STARTED TO DO ANYTHING, OR PREPARED TO DO ANYTHING TO END YOUR LIFE?: NO
2. HAVE YOU ACTUALLY HAD ANY THOUGHTS OF KILLING YOURSELF?: NO

## 2024-06-25 ASSESSMENT — PATIENT HEALTH QUESTIONNAIRE - PHQ9
SUM OF ALL RESPONSES TO PHQ9 QUESTIONS 1 AND 2: 0
1. LITTLE INTEREST OR PLEASURE IN DOING THINGS: NOT AT ALL
2. FEELING DOWN, DEPRESSED OR HOPELESS: NOT AT ALL

## 2024-06-25 NOTE — PROGRESS NOTES
Referred by Gianfranco Anthony MD provider found for   Chief Complaint   Patient presents with    2nd degree AVB    Pacemaker Problem        Adams Moody Md is a 69 y.o. 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 Was recently seen by my partner Dr. Bacon to get established and found to have impending RV lead failure. She discussed the options with the patient and agree to proceed with RV lead extraction and reimplant. Was referred to me for evaluation for the procedure.     PMHx/PSHx: As above    FamHx: unremarkable     Allergies:  No Known Allergies     Review of Systems    Constitutional: not feeling tired.   Eyes: no eyesight problems.   ENT: no hearing loss and no nosebleeds.   Cardiovascular: no intermittent leg claudication and as noted in HPI.   Respiratory: no chronic cough and no shortness of breath.   Gastrointestinal: no change in bowel habits and no blood in stools.   Genitourinary: no urinary frequency and no hematuria.   Skin: no skin rashes.   Neurological: no seizures and no frequent falls.   Psychiatric: no depression and not suicidal.   All other systems have been reviewed and are negative for complaint.     Outpatient Medications:  Current Outpatient Medications   Medication Instructions    aspirin 81 mg, oral, Daily    empagliflozin (JARDIANCE) 10 mg, oral, Daily    loratadine (CLARITIN) 10 mg, oral, As needed    metFORMIN (Glucophage) 500 mg tablet Take 1 tablet (500 mg) by mouth once daily with breakfast AND 2 tablets (1,000 mg) once daily in the evening. Take with meals.    tadalafil (CIALIS) 5 mg, oral, Daily PRN         Last Recorded Vitals:      10/19/2023    11:26 AM 10/19/2023    11:36 AM 10/19/2023    12:05 PM 4/4/2024     1:47 PM 6/19/2024     9:05 AM 6/19/2024     9:09 AM 6/25/2024     3:28 PM   Vitals   Systolic 131 134 145 155 149 148    Diastolic 83 84 64 77 88 80    Heart Rate 64 63 65 69 77 77    Temp  36.6 °C (97.9 °F) 36 °C  "(96.8 °F)       Resp 18 16 15       Height (in)     1.778 m (5' 10\")     Weight (lb)    201 200  198   BMI    28.84 kg/m2 28.7 kg/m2  28.41 kg/m2   BSA (m2)    2.12 m2 2.12 m2  2.11 m2   Visit Report    Report Report Report Report    Visit Vitals  Wt 89.8 kg (198 lb)   BMI 28.41 kg/m²   Smoking Status Never   BSA 2.11 m²        Physical Exam:  Constitutional: alert and in no acute distress.   Eyes: no erythema, swelling or discharge from the eye .   Neck: neck is supple, symmetric, trachea midline, no masses  and no thyromegaly .   Pulmonary: no increased work of breathing or signs of respiratory distress  and lungs clear to auscultation.    Cardiovascular: carotid pulses 2+ bilaterally with no bruit , JVP was normal, no thrills , regular rhythm, normal S1 and S2, no murmurs , pedal pulses 2+ bilaterally  and no edema .   Abdomen: abdomen non-tender, no masses  and no hepatomegaly .   Skin: skin warm and dry, normal skin turgor .   Psychiatric judgment and insight is normal  and oriented to person, place and time .        Assessment/Plan   Problem List Items Addressed This Visit    None  Visit Diagnoses         Codes    Atrioventricular block, complete (Multi)     I44.2    Relevant Orders    ECG 12 lead (Clinic Performed)    Cardiomyopathy, unspecified type (Multi)     I42.9    Relevant Orders    ECG 12 lead (Clinic Performed)            Adams Moody Md is a 69 y.o. 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 Was recently seen by my partner Dr. Bacon to get established and found to have impending RV lead failure. She discussed the options with the patient and agree to proceed with RV lead extraction and reimplant. Was referred to me for evaluation for the procedure.   His current ECG shows A sense V paced, rhythm, HR 62 bpm. The patient meets criteria for lead extraction and reimplant. I discussed the R/B/A of the procedure. The patient expressed understanding and " agrees to proceed.         Gianfranco Bourne MD  Cardiac Electrophysiology      Thank you very much for allowing me to participate in the care of this pleasant patient. Please do not hesitate to contact me with any further questions or concerns regarding his care.    **Disclaimer: This note was dictated by speech recognition, and every effort has been made to prevent any error in transcription, however minor errors may be present**

## 2024-06-26 LAB
ATRIAL RATE: 62 BPM
P AXIS: 37 DEGREES
P OFFSET: 179 MS
P ONSET: 124 MS
PR INTERVAL: 150 MS
Q ONSET: 199 MS
QRS COUNT: 11 BEATS
QRS DURATION: 144 MS
QT INTERVAL: 438 MS
QTC CALCULATION(BAZETT): 444 MS
QTC FREDERICIA: 443 MS
R AXIS: 261 DEGREES
T AXIS: 62 DEGREES
T OFFSET: 418 MS
VENTRICULAR RATE: 62 BPM

## 2024-07-02 LAB
ATRIAL RATE: 68 BPM
P AXIS: 3 DEGREES
P OFFSET: 169 MS
P ONSET: 113 MS
PR INTERVAL: 176 MS
Q ONSET: 201 MS
QRS COUNT: 11 BEATS
QRS DURATION: 136 MS
QT INTERVAL: 416 MS
QTC CALCULATION(BAZETT): 442 MS
QTC FREDERICIA: 434 MS
R AXIS: 254 DEGREES
T AXIS: 64 DEGREES
T OFFSET: 409 MS
VENTRICULAR RATE: 68 BPM

## 2024-07-23 ENCOUNTER — APPOINTMENT (OUTPATIENT)
Dept: CARDIOLOGY | Facility: CLINIC | Age: 69
End: 2024-07-23
Payer: MEDICARE

## 2024-07-25 ENCOUNTER — APPOINTMENT (OUTPATIENT)
Dept: CARDIOLOGY | Facility: CLINIC | Age: 69
End: 2024-07-25
Payer: MEDICARE

## 2024-07-25 ENCOUNTER — ANESTHESIA EVENT (OUTPATIENT)
Dept: OPERATING ROOM | Facility: HOSPITAL | Age: 69
End: 2024-07-25
Payer: MEDICARE

## 2024-07-25 ASSESSMENT — ENCOUNTER SYMPTOMS: FATIGUE: 1

## 2024-07-25 NOTE — H&P
History Of Present Illness  Adams Moody Md is a 69 y.o. male presenting with RV lead failure for RV lead extraction. 69 y.o. 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 Was recently seen by my partner Dr. Bacon to get established and found to have impending RV lead failure. She discussed the options with the patient and agree to proceed with RV lead extraction and reimplant.          Past Medical History  Past Medical History:   Diagnosis Date    Pacemaker     medtronic       Surgical History  No past surgical history on file.     Social History  He reports that he has never smoked. He has never used smokeless tobacco. Alcohol use questions deferred to the physician. He reports that he does not use drugs.    Family History  No family history on file.     Allergies  Patient has no known allergies.    Review of Systems   Constitutional:  Positive for fatigue.     Physical Exam  Vitals reviewed.   Constitutional:       General: He is not in acute distress.     Appearance: Normal appearance. He is normal weight.   HENT:      Head: Normocephalic and atraumatic.      Nose: Nose normal. No congestion or rhinorrhea.      Mouth/Throat:      Mouth: Mucous membranes are moist.      Pharynx: Oropharynx is clear. No oropharyngeal exudate or posterior oropharyngeal erythema.   Eyes:      Extraocular Movements: Extraocular movements intact.      Conjunctiva/sclera: Conjunctivae normal.      Pupils: Pupils are equal, round, and reactive to light.   Neck:      Vascular: No carotid bruit.   Cardiovascular:      Rate and Rhythm: Normal rate and regular rhythm.      Pulses: Normal pulses.      Heart sounds: Normal heart sounds. No murmur heard.     No friction rub. No gallop.   Pulmonary:      Effort: Pulmonary effort is normal. No respiratory distress.      Breath sounds: Normal breath sounds. No wheezing or rales.   Abdominal:      General: Abdomen is flat. Bowel sounds are  normal. There is no distension.      Palpations: Abdomen is soft.      Tenderness: There is no abdominal tenderness.   Musculoskeletal:         General: No swelling. Normal range of motion.      Cervical back: Normal range of motion.   Lymphadenopathy:      Cervical: No cervical adenopathy.   Skin:     General: Skin is warm and dry.      Capillary Refill: Capillary refill takes less than 2 seconds.      Findings: No erythema, lesion or rash.   Neurological:      General: No focal deficit present.      Mental Status: He is alert and oriented to person, place, and time. Mental status is at baseline.   Psychiatric:         Mood and Affect: Mood normal.         Behavior: Behavior normal.         Thought Content: Thought content normal.         Judgment: Judgment normal.        Last Recorded Vitals  There were no vitals taken for this visit.    Relevant Results      RESULTS REVIEWED      Assessment/Plan   Active Problems:  There are no active Hospital Problems.    RV LEAD MALFUNCTION    LASER EXTRACTION, GENERAL ANESTHESIA  GENERAL OR, CTS BACKUP       I spent 30 minutes in the professional and overall care of this patient.      Yosef Talavera MD

## 2024-07-26 ENCOUNTER — APPOINTMENT (OUTPATIENT)
Dept: CARDIOLOGY | Facility: HOSPITAL | Age: 69
End: 2024-07-26
Payer: MEDICARE

## 2024-07-26 ENCOUNTER — ANESTHESIA (OUTPATIENT)
Dept: OPERATING ROOM | Facility: HOSPITAL | Age: 69
End: 2024-07-26
Payer: MEDICARE

## 2024-07-26 ENCOUNTER — HOSPITAL ENCOUNTER (OUTPATIENT)
Facility: HOSPITAL | Age: 69
LOS: 1 days | Discharge: HOME | End: 2024-07-27
Attending: INTERNAL MEDICINE | Admitting: INTERNAL MEDICINE
Payer: MEDICARE

## 2024-07-26 ENCOUNTER — HOSPITAL ENCOUNTER (OUTPATIENT)
Dept: OPERATING ROOM | Facility: HOSPITAL | Age: 69
Discharge: HOME | End: 2024-07-26

## 2024-07-26 ENCOUNTER — APPOINTMENT (OUTPATIENT)
Dept: RADIOLOGY | Facility: HOSPITAL | Age: 69
End: 2024-07-26
Payer: MEDICARE

## 2024-07-26 DIAGNOSIS — I49.9 CARDIAC ARRHYTHMIA, UNSPECIFIED: ICD-10-CM

## 2024-07-26 DIAGNOSIS — T82.110A BREAKDOWN (MECHANICAL) OF CARDIAC ELECTRODE, INITIAL ENCOUNTER: ICD-10-CM

## 2024-07-26 DIAGNOSIS — I44.2 COMPLETE HEART BLOCK (MULTI): Primary | ICD-10-CM

## 2024-07-26 PROBLEM — I10 HTN (HYPERTENSION): Status: ACTIVE | Noted: 2024-07-26

## 2024-07-26 PROBLEM — H91.90 HEARING LOSS: Status: ACTIVE | Noted: 2024-07-26

## 2024-07-26 LAB
ABO GROUP (TYPE) IN BLOOD: NORMAL
ABO GROUP (TYPE) IN BLOOD: NORMAL
ACT BLD: 155 SEC (ref 83–199)
ALBUMIN SERPL BCP-MCNC: 3.5 G/DL (ref 3.4–5)
ALP SERPL-CCNC: 65 U/L (ref 33–136)
ALT SERPL W P-5'-P-CCNC: 21 U/L (ref 10–52)
ANION GAP SERPL CALC-SCNC: 11 MMOL/L (ref 10–20)
ANTIBODY SCREEN: NORMAL
AST SERPL W P-5'-P-CCNC: 22 U/L (ref 9–39)
BASOPHILS # BLD AUTO: 0.01 X10*3/UL (ref 0–0.1)
BASOPHILS NFR BLD AUTO: 0.2 %
BILIRUB SERPL-MCNC: 0.7 MG/DL (ref 0–1.2)
BLOOD EXPIRATION DATE: NORMAL
BUN SERPL-MCNC: 23 MG/DL (ref 6–23)
CALCIUM SERPL-MCNC: 8.7 MG/DL (ref 8.6–10.6)
CHLORIDE SERPL-SCNC: 104 MMOL/L (ref 98–107)
CO2 SERPL-SCNC: 28 MMOL/L (ref 21–32)
CREAT SERPL-MCNC: 0.85 MG/DL (ref 0.5–1.3)
DISPENSE STATUS: NORMAL
EGFRCR SERPLBLD CKD-EPI 2021: >90 ML/MIN/1.73M*2
EJECTION FRACTION: 60 %
EOSINOPHIL # BLD AUTO: 0.13 X10*3/UL (ref 0–0.7)
EOSINOPHIL NFR BLD AUTO: 2.3 %
ERYTHROCYTE [DISTWIDTH] IN BLOOD BY AUTOMATED COUNT: 11.9 % (ref 11.5–14.5)
GLUCOSE BLD MANUAL STRIP-MCNC: 141 MG/DL (ref 74–99)
GLUCOSE BLD MANUAL STRIP-MCNC: 147 MG/DL (ref 74–99)
GLUCOSE BLD MANUAL STRIP-MCNC: 157 MG/DL (ref 74–99)
GLUCOSE BLD MANUAL STRIP-MCNC: 169 MG/DL (ref 74–99)
GLUCOSE SERPL-MCNC: 159 MG/DL (ref 74–99)
HCT VFR BLD AUTO: 37.4 % (ref 41–52)
HGB BLD-MCNC: 13.1 G/DL (ref 13.5–17.5)
IMM GRANULOCYTES # BLD AUTO: 0.01 X10*3/UL (ref 0–0.7)
IMM GRANULOCYTES NFR BLD AUTO: 0.2 % (ref 0–0.9)
LYMPHOCYTES # BLD AUTO: 1.45 X10*3/UL (ref 1.2–4.8)
LYMPHOCYTES NFR BLD AUTO: 25.5 %
MAGNESIUM SERPL-MCNC: 1.64 MG/DL (ref 1.6–2.4)
MCH RBC QN AUTO: 28.6 PG (ref 26–34)
MCHC RBC AUTO-ENTMCNC: 35 G/DL (ref 32–36)
MCV RBC AUTO: 82 FL (ref 80–100)
MONOCYTES # BLD AUTO: 0.49 X10*3/UL (ref 0.1–1)
MONOCYTES NFR BLD AUTO: 8.6 %
NEUTROPHILS # BLD AUTO: 3.6 X10*3/UL (ref 1.2–7.7)
NEUTROPHILS NFR BLD AUTO: 63.2 %
NRBC BLD-RTO: 0 /100 WBCS (ref 0–0)
PHOSPHATE SERPL-MCNC: 3.1 MG/DL (ref 2.5–4.9)
PLATELET # BLD AUTO: 131 X10*3/UL (ref 150–450)
POTASSIUM SERPL-SCNC: 4 MMOL/L (ref 3.5–5.3)
PRODUCT BLOOD TYPE: 5100
PRODUCT CODE: NORMAL
PROT SERPL-MCNC: 5.7 G/DL (ref 6.4–8.2)
RBC # BLD AUTO: 4.58 X10*6/UL (ref 4.5–5.9)
RH FACTOR (ANTIGEN D): NORMAL
RH FACTOR (ANTIGEN D): NORMAL
SODIUM SERPL-SCNC: 139 MMOL/L (ref 136–145)
UNIT ABO: NORMAL
UNIT NUMBER: NORMAL
UNIT RH: NORMAL
UNIT VOLUME: 287
UNIT VOLUME: 314
UNIT VOLUME: 321
UNIT VOLUME: 329
WBC # BLD AUTO: 5.7 X10*3/UL (ref 4.4–11.3)
XM INTEP: NORMAL

## 2024-07-26 PROCEDURE — 37799 UNLISTED PX VASCULAR SURGERY: CPT

## 2024-07-26 PROCEDURE — 85025 COMPLETE CBC W/AUTO DIFF WBC: CPT

## 2024-07-26 PROCEDURE — C1760 CLOSURE DEV, VASC: HCPCS | Performed by: INTERNAL MEDICINE

## 2024-07-26 PROCEDURE — 2550000001 HC RX 255 CONTRASTS: Performed by: INTERNAL MEDICINE

## 2024-07-26 PROCEDURE — 82947 ASSAY GLUCOSE BLOOD QUANT: CPT

## 2024-07-26 PROCEDURE — 99233 SBSQ HOSP IP/OBS HIGH 50: CPT

## 2024-07-26 PROCEDURE — 3600000008 HC OR TIME - EACH INCREMENTAL 1 MINUTE - PROCEDURE LEVEL THREE: Performed by: INTERNAL MEDICINE

## 2024-07-26 PROCEDURE — 33233 REMOVAL OF PM GENERATOR: CPT | Performed by: INTERNAL MEDICINE

## 2024-07-26 PROCEDURE — 2500000004 HC RX 250 GENERAL PHARMACY W/ HCPCS (ALT 636 FOR OP/ED): Mod: JG | Performed by: INTERNAL MEDICINE

## 2024-07-26 PROCEDURE — 80053 COMPREHEN METABOLIC PANEL: CPT

## 2024-07-26 PROCEDURE — 86901 BLOOD TYPING SEROLOGIC RH(D): CPT | Performed by: INTERNAL MEDICINE

## 2024-07-26 PROCEDURE — A4649 SURGICAL SUPPLIES: HCPCS | Performed by: INTERNAL MEDICINE

## 2024-07-26 PROCEDURE — 36415 COLL VENOUS BLD VENIPUNCTURE: CPT | Performed by: INTERNAL MEDICINE

## 2024-07-26 PROCEDURE — C1893 INTRO/SHEATH, FIXED,NON-PEEL: HCPCS | Performed by: INTERNAL MEDICINE

## 2024-07-26 PROCEDURE — 2500000004 HC RX 250 GENERAL PHARMACY W/ HCPCS (ALT 636 FOR OP/ED)

## 2024-07-26 PROCEDURE — 85347 COAGULATION TIME ACTIVATED: CPT

## 2024-07-26 PROCEDURE — 7100000011 HC EXTENDED STAY RECOVERY HOURLY - NURSING UNIT

## 2024-07-26 PROCEDURE — C2621 PMKR, OTHER THAN SING/DUAL: HCPCS | Performed by: INTERNAL MEDICINE

## 2024-07-26 PROCEDURE — 2780000003 HC OR 278 NO HCPCS: Performed by: INTERNAL MEDICINE

## 2024-07-26 PROCEDURE — 83735 ASSAY OF MAGNESIUM: CPT

## 2024-07-26 PROCEDURE — C1781 MESH (IMPLANTABLE): HCPCS | Performed by: INTERNAL MEDICINE

## 2024-07-26 PROCEDURE — 71045 X-RAY EXAM CHEST 1 VIEW: CPT

## 2024-07-26 PROCEDURE — C1887 CATHETER, GUIDING: HCPCS | Performed by: INTERNAL MEDICINE

## 2024-07-26 PROCEDURE — C1730 CATH, EP, 19 OR FEW ELECT: HCPCS | Performed by: INTERNAL MEDICINE

## 2024-07-26 PROCEDURE — 2500000005 HC RX 250 GENERAL PHARMACY W/O HCPCS: Performed by: INTERNAL MEDICINE

## 2024-07-26 PROCEDURE — 3700000002 HC GENERAL ANESTHESIA TIME - EACH INCREMENTAL 1 MINUTE: Performed by: INTERNAL MEDICINE

## 2024-07-26 PROCEDURE — C1892 INTRO/SHEATH,FIXED,PEEL-AWAY: HCPCS | Performed by: INTERNAL MEDICINE

## 2024-07-26 PROCEDURE — A4312 CATH W/O BAG 2-WAY SILICONE: HCPCS | Performed by: INTERNAL MEDICINE

## 2024-07-26 PROCEDURE — 33235 REMOVAL PACEMAKER ELECTRODE: CPT | Performed by: INTERNAL MEDICINE

## 2024-07-26 PROCEDURE — 71045 X-RAY EXAM CHEST 1 VIEW: CPT | Performed by: RADIOLOGY

## 2024-07-26 PROCEDURE — 2500000001 HC RX 250 WO HCPCS SELF ADMINISTERED DRUGS (ALT 637 FOR MEDICARE OP): Performed by: STUDENT IN AN ORGANIZED HEALTH CARE EDUCATION/TRAINING PROGRAM

## 2024-07-26 PROCEDURE — C2629 INTRO/SHEATH, LASER: HCPCS | Performed by: INTERNAL MEDICINE

## 2024-07-26 PROCEDURE — 86923 COMPATIBILITY TEST ELECTRIC: CPT

## 2024-07-26 PROCEDURE — 2720000007 HC OR 272 NO HCPCS: Performed by: INTERNAL MEDICINE

## 2024-07-26 PROCEDURE — 2500000004 HC RX 250 GENERAL PHARMACY W/ HCPCS (ALT 636 FOR OP/ED): Performed by: ANESTHESIOLOGIST ASSISTANT

## 2024-07-26 PROCEDURE — 3600000003 HC OR TIME - INITIAL BASE CHARGE - PROCEDURE LEVEL THREE: Performed by: INTERNAL MEDICINE

## 2024-07-26 PROCEDURE — 88300 SURGICAL PATH GROSS: CPT | Mod: TC,SUR | Performed by: INTERNAL MEDICINE

## 2024-07-26 PROCEDURE — 33208 INSRT HEART PM ATRIAL & VENT: CPT | Performed by: INTERNAL MEDICINE

## 2024-07-26 PROCEDURE — 84100 ASSAY OF PHOSPHORUS: CPT

## 2024-07-26 PROCEDURE — 36415 COLL VENOUS BLD VENIPUNCTURE: CPT | Performed by: ANESTHESIOLOGIST ASSISTANT

## 2024-07-26 PROCEDURE — C1769 GUIDE WIRE: HCPCS | Performed by: INTERNAL MEDICINE

## 2024-07-26 PROCEDURE — C1898 LEAD, PMKR, OTHER THAN TRANS: HCPCS | Performed by: INTERNAL MEDICINE

## 2024-07-26 PROCEDURE — 2500000005 HC RX 250 GENERAL PHARMACY W/O HCPCS: Performed by: ANESTHESIOLOGIST ASSISTANT

## 2024-07-26 PROCEDURE — 3600000011 HC PERFUSION TIME - INITIAL BASE CHARGE: Performed by: INTERNAL MEDICINE

## 2024-07-26 PROCEDURE — 2500000002 HC RX 250 W HCPCS SELF ADMINISTERED DRUGS (ALT 637 FOR MEDICARE OP, ALT 636 FOR OP/ED)

## 2024-07-26 PROCEDURE — C1894 INTRO/SHEATH, NON-LASER: HCPCS | Performed by: INTERNAL MEDICINE

## 2024-07-26 PROCEDURE — 3600000012 HC PERFUSION TIME - EACH INCREMENTAL 1 MINUTE: Performed by: INTERNAL MEDICINE

## 2024-07-26 PROCEDURE — 2750000001 HC OR 275 NO HCPCS: Performed by: INTERNAL MEDICINE

## 2024-07-26 PROCEDURE — 3700000001 HC GENERAL ANESTHESIA TIME - INITIAL BASE CHARGE: Performed by: INTERNAL MEDICINE

## 2024-07-26 DEVICE — IMPLANTABLE DEVICE: Type: IMPLANTABLE DEVICE | Site: HEART | Status: FUNCTIONAL

## 2024-07-26 DEVICE — IMPLANTABLE DEVICE: Type: IMPLANTABLE DEVICE | Site: CHEST | Status: FUNCTIONAL

## 2024-07-26 DEVICE — IMPLANTABLE DEVICE: Type: IMPLANTABLE DEVICE | Site: CHEST | Status: NON-FUNCTIONAL

## 2024-07-26 RX ORDER — DEXTROSE 50 % IN WATER (D50W) INTRAVENOUS SYRINGE
25
Status: DISCONTINUED | OUTPATIENT
Start: 2024-07-26 | End: 2024-07-27 | Stop reason: HOSPADM

## 2024-07-26 RX ORDER — BUPIVACAINE HYDROCHLORIDE 2.5 MG/ML
INJECTION, SOLUTION INFILTRATION; PERINEURAL AS NEEDED
Status: DISCONTINUED | OUTPATIENT
Start: 2024-07-26 | End: 2024-07-26 | Stop reason: HOSPADM

## 2024-07-26 RX ORDER — FENTANYL CITRATE 50 UG/ML
25 INJECTION, SOLUTION INTRAMUSCULAR; INTRAVENOUS EVERY 5 MIN PRN
Status: CANCELLED | OUTPATIENT
Start: 2024-07-26

## 2024-07-26 RX ORDER — IODIXANOL 320 MG/ML
INJECTION, SOLUTION INTRAVASCULAR AS NEEDED
Status: DISCONTINUED | OUTPATIENT
Start: 2024-07-26 | End: 2024-07-26 | Stop reason: HOSPADM

## 2024-07-26 RX ORDER — PROPOFOL 10 MG/ML
INJECTION, EMULSION INTRAVENOUS AS NEEDED
Status: DISCONTINUED | OUTPATIENT
Start: 2024-07-26 | End: 2024-07-26

## 2024-07-26 RX ORDER — ACETAMINOPHEN 325 MG/1
650 TABLET ORAL EVERY 4 HOURS PRN
Status: DISCONTINUED | OUTPATIENT
Start: 2024-07-26 | End: 2024-07-27 | Stop reason: HOSPADM

## 2024-07-26 RX ORDER — PHENYLEPHRINE HCL IN 0.9% NACL 0.4MG/10ML
SYRINGE (ML) INTRAVENOUS AS NEEDED
Status: DISCONTINUED | OUTPATIENT
Start: 2024-07-26 | End: 2024-07-26

## 2024-07-26 RX ORDER — DEXTROSE 50 % IN WATER (D50W) INTRAVENOUS SYRINGE
12.5
Status: DISCONTINUED | OUTPATIENT
Start: 2024-07-26 | End: 2024-07-27 | Stop reason: HOSPADM

## 2024-07-26 RX ORDER — FENTANYL CITRATE 50 UG/ML
50 INJECTION, SOLUTION INTRAMUSCULAR; INTRAVENOUS EVERY 5 MIN PRN
Status: CANCELLED | OUTPATIENT
Start: 2024-07-26

## 2024-07-26 RX ORDER — HYDRALAZINE HYDROCHLORIDE 25 MG/1
25 TABLET, FILM COATED ORAL ONCE
Status: COMPLETED | OUTPATIENT
Start: 2024-07-26 | End: 2024-07-26

## 2024-07-26 RX ORDER — FENTANYL CITRATE 50 UG/ML
12.5 INJECTION, SOLUTION INTRAMUSCULAR; INTRAVENOUS EVERY 5 MIN PRN
Status: CANCELLED | OUTPATIENT
Start: 2024-07-26

## 2024-07-26 RX ORDER — SODIUM CHLORIDE, SODIUM LACTATE, POTASSIUM CHLORIDE, CALCIUM CHLORIDE 600; 310; 30; 20 MG/100ML; MG/100ML; MG/100ML; MG/100ML
INJECTION, SOLUTION INTRAVENOUS CONTINUOUS PRN
Status: DISCONTINUED | OUTPATIENT
Start: 2024-07-26 | End: 2024-07-26

## 2024-07-26 RX ORDER — ACETAMINOPHEN 650 MG/1
650 SUPPOSITORY RECTAL EVERY 4 HOURS PRN
Status: DISCONTINUED | OUTPATIENT
Start: 2024-07-26 | End: 2024-07-27 | Stop reason: HOSPADM

## 2024-07-26 RX ORDER — ROCURONIUM BROMIDE 10 MG/ML
INJECTION, SOLUTION INTRAVENOUS AS NEEDED
Status: DISCONTINUED | OUTPATIENT
Start: 2024-07-26 | End: 2024-07-26

## 2024-07-26 RX ORDER — SODIUM CHLORIDE 0.9 G/100ML
IRRIGANT IRRIGATION AS NEEDED
Status: DISCONTINUED | OUTPATIENT
Start: 2024-07-26 | End: 2024-07-26 | Stop reason: HOSPADM

## 2024-07-26 RX ORDER — INSULIN LISPRO 100 [IU]/ML
0-10 INJECTION, SOLUTION INTRAVENOUS; SUBCUTANEOUS
Status: DISCONTINUED | OUTPATIENT
Start: 2024-07-26 | End: 2024-07-27 | Stop reason: HOSPADM

## 2024-07-26 RX ORDER — CEFAZOLIN 1 G/1
INJECTION, POWDER, FOR SOLUTION INTRAVENOUS AS NEEDED
Status: DISCONTINUED | OUTPATIENT
Start: 2024-07-26 | End: 2024-07-26

## 2024-07-26 RX ORDER — PHENYLEPHRINE 10 MG/250 ML(40 MCG/ML)IN 0.9 % SOD.CHLORIDE INTRAVENOUS
CONTINUOUS PRN
Status: DISCONTINUED | OUTPATIENT
Start: 2024-07-26 | End: 2024-07-26

## 2024-07-26 RX ORDER — DROPERIDOL 2.5 MG/ML
0.62 INJECTION, SOLUTION INTRAMUSCULAR; INTRAVENOUS ONCE AS NEEDED
Status: CANCELLED | OUTPATIENT
Start: 2024-07-26

## 2024-07-26 RX ORDER — ONDANSETRON HYDROCHLORIDE 2 MG/ML
INJECTION, SOLUTION INTRAVENOUS AS NEEDED
Status: DISCONTINUED | OUTPATIENT
Start: 2024-07-26 | End: 2024-07-26

## 2024-07-26 RX ORDER — MAGNESIUM SULFATE HEPTAHYDRATE 40 MG/ML
2 INJECTION, SOLUTION INTRAVENOUS ONCE
Status: COMPLETED | OUTPATIENT
Start: 2024-07-26 | End: 2024-07-26

## 2024-07-26 RX ORDER — FENTANYL CITRATE 50 UG/ML
INJECTION, SOLUTION INTRAMUSCULAR; INTRAVENOUS AS NEEDED
Status: DISCONTINUED | OUTPATIENT
Start: 2024-07-26 | End: 2024-07-26

## 2024-07-26 RX ORDER — LIDOCAINE HYDROCHLORIDE 10 MG/ML
0.1 INJECTION INFILTRATION; PERINEURAL ONCE
Status: CANCELLED | OUTPATIENT
Start: 2024-07-26 | End: 2024-07-26

## 2024-07-26 RX ORDER — ACETAMINOPHEN 160 MG/5ML
650 SOLUTION ORAL EVERY 4 HOURS PRN
Status: DISCONTINUED | OUTPATIENT
Start: 2024-07-26 | End: 2024-07-27 | Stop reason: HOSPADM

## 2024-07-26 RX ORDER — SODIUM CHLORIDE, SODIUM LACTATE, POTASSIUM CHLORIDE, CALCIUM CHLORIDE 600; 310; 30; 20 MG/100ML; MG/100ML; MG/100ML; MG/100ML
100 INJECTION, SOLUTION INTRAVENOUS CONTINUOUS
Status: CANCELLED | OUTPATIENT
Start: 2024-07-26

## 2024-07-26 RX ORDER — LIDOCAINE HCL/PF 100 MG/5ML
SYRINGE (ML) INTRAVENOUS AS NEEDED
Status: DISCONTINUED | OUTPATIENT
Start: 2024-07-26 | End: 2024-07-26

## 2024-07-26 RX ORDER — OXYCODONE HYDROCHLORIDE 5 MG/1
5 TABLET ORAL EVERY 6 HOURS PRN
Status: DISCONTINUED | OUTPATIENT
Start: 2024-07-26 | End: 2024-07-27 | Stop reason: HOSPADM

## 2024-07-26 RX ORDER — ONDANSETRON HYDROCHLORIDE 2 MG/ML
4 INJECTION, SOLUTION INTRAVENOUS ONCE AS NEEDED
Status: CANCELLED | OUTPATIENT
Start: 2024-07-26

## 2024-07-26 RX ORDER — CEPHALEXIN 500 MG/1
500 CAPSULE ORAL EVERY 8 HOURS SCHEDULED
Status: DISCONTINUED | OUTPATIENT
Start: 2024-07-26 | End: 2024-08-03 | Stop reason: HOSPADM

## 2024-07-26 RX ADMIN — CEPHALEXIN 500 MG: 500 CAPSULE ORAL at 21:08

## 2024-07-26 RX ADMIN — INSULIN LISPRO 2 UNITS: 100 INJECTION, SOLUTION INTRAVENOUS; SUBCUTANEOUS at 17:32

## 2024-07-26 RX ADMIN — ACETAMINOPHEN 650 MG: 325 TABLET ORAL at 13:05

## 2024-07-26 RX ADMIN — MAGNESIUM SULFATE HEPTAHYDRATE 2 G: 40 INJECTION, SOLUTION INTRAVENOUS at 14:05

## 2024-07-26 RX ADMIN — HYDRALAZINE HYDROCHLORIDE 25 MG: 25 TABLET ORAL at 21:08

## 2024-07-26 RX ADMIN — CEPHALEXIN 500 MG: 500 CAPSULE ORAL at 13:05

## 2024-07-26 RX ADMIN — ACETAMINOPHEN 650 MG: 325 TABLET ORAL at 21:12

## 2024-07-26 SDOH — SOCIAL STABILITY: SOCIAL INSECURITY: HAVE YOU HAD THOUGHTS OF HARMING ANYONE ELSE?: NO

## 2024-07-26 SDOH — SOCIAL STABILITY: SOCIAL INSECURITY: DOES ANYONE TRY TO KEEP YOU FROM HAVING/CONTACTING OTHER FRIENDS OR DOING THINGS OUTSIDE YOUR HOME?: NO

## 2024-07-26 SDOH — SOCIAL STABILITY: SOCIAL INSECURITY: HAS ANYONE EVER THREATENED TO HURT YOUR FAMILY OR YOUR PETS?: NO

## 2024-07-26 SDOH — SOCIAL STABILITY: SOCIAL INSECURITY: ARE THERE ANY APPARENT SIGNS OF INJURIES/BEHAVIORS THAT COULD BE RELATED TO ABUSE/NEGLECT?: NO

## 2024-07-26 SDOH — SOCIAL STABILITY: SOCIAL INSECURITY: DO YOU FEEL UNSAFE GOING BACK TO THE PLACE WHERE YOU ARE LIVING?: NO

## 2024-07-26 SDOH — SOCIAL STABILITY: SOCIAL INSECURITY: HAVE YOU HAD ANY THOUGHTS OF HARMING ANYONE ELSE?: NO

## 2024-07-26 SDOH — SOCIAL STABILITY: SOCIAL INSECURITY: DO YOU FEEL ANYONE HAS EXPLOITED OR TAKEN ADVANTAGE OF YOU FINANCIALLY OR OF YOUR PERSONAL PROPERTY?: NO

## 2024-07-26 SDOH — SOCIAL STABILITY: SOCIAL INSECURITY: WERE YOU ABLE TO COMPLETE ALL THE BEHAVIORAL HEALTH SCREENINGS?: YES

## 2024-07-26 SDOH — SOCIAL STABILITY: SOCIAL INSECURITY: ARE YOU OR HAVE YOU BEEN THREATENED OR ABUSED PHYSICALLY, EMOTIONALLY, OR SEXUALLY BY ANYONE?: NO

## 2024-07-26 SDOH — SOCIAL STABILITY: SOCIAL INSECURITY: ABUSE: ADULT

## 2024-07-26 ASSESSMENT — COLUMBIA-SUICIDE SEVERITY RATING SCALE - C-SSRS
1. IN THE PAST MONTH, HAVE YOU WISHED YOU WERE DEAD OR WISHED YOU COULD GO TO SLEEP AND NOT WAKE UP?: NO
2. HAVE YOU ACTUALLY HAD ANY THOUGHTS OF KILLING YOURSELF?: NO
6. HAVE YOU EVER DONE ANYTHING, STARTED TO DO ANYTHING, OR PREPARED TO DO ANYTHING TO END YOUR LIFE?: NO

## 2024-07-26 ASSESSMENT — PAIN SCALES - GENERAL
PAINLEVEL_OUTOF10: 1
PAINLEVEL_OUTOF10: 1
PAINLEVEL_OUTOF10: 0 - NO PAIN
PAINLEVEL_OUTOF10: 3
PAINLEVEL_OUTOF10: 0 - NO PAIN
PAINLEVEL_OUTOF10: 3
PAINLEVEL_OUTOF10: 0 - NO PAIN

## 2024-07-26 ASSESSMENT — ACTIVITIES OF DAILY LIVING (ADL)
JUDGMENT_ADEQUATE_SAFELY_COMPLETE_DAILY_ACTIVITIES: YES
ADEQUATE_TO_COMPLETE_ADL: YES
DRESSING YOURSELF: INDEPENDENT
GROOMING: INDEPENDENT
TOILETING: INDEPENDENT
BATHING: INDEPENDENT
LACK_OF_TRANSPORTATION: NO
HEARING - RIGHT EAR: FUNCTIONAL
HEARING - LEFT EAR: DEAF
PATIENT'S MEMORY ADEQUATE TO SAFELY COMPLETE DAILY ACTIVITIES?: YES
WALKS IN HOME: INDEPENDENT
FEEDING YOURSELF: INDEPENDENT

## 2024-07-26 ASSESSMENT — PATIENT HEALTH QUESTIONNAIRE - PHQ9
1. LITTLE INTEREST OR PLEASURE IN DOING THINGS: NOT AT ALL
2. FEELING DOWN, DEPRESSED OR HOPELESS: NOT AT ALL
SUM OF ALL RESPONSES TO PHQ9 QUESTIONS 1 & 2: 0

## 2024-07-26 ASSESSMENT — COGNITIVE AND FUNCTIONAL STATUS - GENERAL
DAILY ACTIVITIY SCORE: 24
PATIENT BASELINE BEDBOUND: NO
MOBILITY SCORE: 24

## 2024-07-26 ASSESSMENT — PAIN - FUNCTIONAL ASSESSMENT
PAIN_FUNCTIONAL_ASSESSMENT: 0-10

## 2024-07-26 ASSESSMENT — LIFESTYLE VARIABLES
HOW OFTEN DO YOU HAVE A DRINK CONTAINING ALCOHOL: MONTHLY OR LESS
HOW OFTEN DO YOU HAVE 6 OR MORE DRINKS ON ONE OCCASION: NEVER
AUDIT-C TOTAL SCORE: 1
SKIP TO QUESTIONS 9-10: 1
AUDIT-C TOTAL SCORE: 1
PRESCIPTION_ABUSE_PAST_12_MONTHS: NO
HOW MANY STANDARD DRINKS CONTAINING ALCOHOL DO YOU HAVE ON A TYPICAL DAY: PATIENT DOES NOT DRINK
SUBSTANCE_ABUSE_PAST_12_MONTHS: NO

## 2024-07-26 ASSESSMENT — PAIN DESCRIPTION - DESCRIPTORS: DESCRIPTORS: ACHING

## 2024-07-26 NOTE — H&P
CICU Admission Note      HPI:  Adams Moody Md is a 69 y.o. male with a hx of high grade AVB s/p CRT P (2018 [facility]), HTN, NIDDM admitted to the CICU following successful RV lead extraction iso impending RV lead failure.  Intra-op YEN with LVEF 60%. Pt had presented initially with exertional lighteheadedness and found to have infra-hisian disease and underwent CRT P in 2018. Thought to be for high burden of RV pacing. In 10/23, found to have high RV threshold during ortho surgery and referred to [facility] for device care.     Pt tolerated the procedure well and upon arrival to CICU is only complaining of left shoulder pain and eye blurriness from his contacts being misaligned. He denies any other complaints such as chest pain, palpitations, shortness of breath.       Electrophysiology procedure    Result Date: 7/26/2024  Procedure: Removal of transvenous pacemaker electrode (74291), removal of PPM pulse generator with replacement of PPM pulse generator, multi lead system (09313) The procedure was performed under general anesthesia. Right and left femoral venous accesses were obtained. A temporary pacemaker was placed in the RV apex. The existing pocket in the right pre-pectoral area was incised, and the device delivered.  Access was obtained to the left subclavian vein with a first rib approach technique. The device was disconnected from the leads, and the leads were dissected to the tie down sleeves. The RV lead were prepped in the usual fashion, and a 12 and then a 14 Fr. laser sheath were used to extract the RV lead. Fibrosis was noted throughout the length of the lead. After extraction, the blood pressure was stable, and  x-raym and YEN revealed no effusions. A pace/sense lead was placed to the RV septal apex where appropriate function was demonstrated. The lead was sutured to the pectoralis muscle with a suture sleeve. . The leads were connected to an PPM pulse generator and placed into the prepectoral pocket after  irrigation with antibiotic solutions. A TYRx antibiotic pouch was also inserted in the pocket. The wound was closed with layers of absorbable suture. The femoral access were closed with Vascade. The procedure was tolerated well and there were no complications.     Anesthesia Intraoperative Transesophageal Echocardiogram    Result Date: 7/26/2024  [facility] - Dept of Anesthesiology    [address] [place] [address]       Tel [phone] and Fax [phone] TRANSESOPHAGEAL ECHOCARDIOGRAM REPORT  Patient Name:      STEFFI WALKER MD    Reading           [id number] Basurto                                        Physician:        MD Study Date:        7/26/2024           Ordering          [id number]OHITH DURAN                                        Provider: MRN/PID:           [id number]            Fellow: Accession#:        [id number]        Nurse: Date of Birth/Age: 1955 / 69      Sonographer:                    years Gender:            M                   Additional Staff: BSA / BMI:         m2 / kg/m2          Encounter#:       [id number] Study Type:    ANESTHESIA INTRAOPERATIVE YEN Diagnosis/ICD: Breakdown (mechanical) of cardiac electrode, initial                encounter-T82.110A CPT Code:      Echocardiography, YEN for monitoring-64351 PHYSICIAN INTERPRETATION: Left Ventricle: The left ventricular systolic function is normal, with a visually estimated ejection fraction of 60%. The left ventricular cavity size was not assessed. Left ventricular diastolic filling was not assessed. Left Atrium: The left atrium was not assessed. Left atrial appendage was not assessed. Right Ventricle: The right ventricle was not assessed. Right ventricular systolic function not assessed. Right Atrium: The right atrium was not assessed. Aortic Valve: The aortic valve was not assessed. Aortic valve regurgitation was not assessed. Mitral Valve: The mitral valve was not assessed. Mitral valve  regurgitation was not assessed. Tricuspid Valve: The tricuspid valve is structurally normal. There is trace to mild tricuspid regurgitation. Pulmonic Valve: The pulmonic valve was not assessed. Pulmonic valve regurgitation was not assessed. Pericardium: There is a trivial pericardial effusion. Aorta: The aortic root was not assessed. In comparison to the previous echocardiogram(s): Not performed on intraoperative study.  CONCLUSIONS:  1. The left ventricular systolic function is normal, with a visually estimated ejection fraction of 60%.  2. Right ventricular systolic function not assessed. QUANTITATIVE DATA SUMMARY: LV SYSTOLIC FUNCTION BY 2D PLANIMETRY (MOD):                      Normal Ranges: EF-Visual:      60 % LV EF Reported: 60 %  [id number] Kavin Basurto MD Electronically signed on 7/26/2024 at 10:21:49 AM ** Final **          CARDIAC DATA:  EKG:  Encounter Date: 06/25/24   ECG 12 lead (Clinic Performed)   Result Value    Ventricular Rate 62    Atrial Rate 62    TX Interval 150    QRS Duration 144    QT Interval 438    QTC Calculation(Bazett) 444    P Axis 37    R Axis 261    T Axis 62    QRS Count 11    Q Onset 199    P Onset 124    P Offset 179    T Offset 418    QTC Fredericia 443    Narrative    Atrial-sensed ventricular-paced rhythm    Confirmed by Gianfranco Bourne ([id number]) on 6/26/2024 11:18:01 AM       ASCVD RISK: The 10-year ASCVD risk score (Adri DK, et al., 2019) is: 47.8%    Values used to calculate the score:      Age: 69 years      Sex: Male      Is Non- : No      Diabetic: Yes      Tobacco smoker: No      Systolic Blood Pressure: 166 mmHg      Is BP treated: No      HDL Cholesterol: 33 mg/dL      Total Cholesterol: 156 mg/dL         Medications prior to admission:  Prior to Admission medications    Medication Sig Start Date End Date Taking? Authorizing Provider   aspirin 81 mg EC tablet Take 1 tablet (81 mg) by mouth once daily.   Yes Historical Provider, MD   loratadine  (Claritin) 10 mg tablet Take 1 tablet (10 mg) by mouth if needed for allergies.   Yes Historical Provider, MD   metFORMIN (Glucophage) 500 mg tablet Take 1 tablet (500 mg) by mouth once daily with breakfast AND 2 tablets (1,000 mg) once daily in the evening. Take with meals. 24  Yes Steven Horton MD   tadalafil (Cialis) 5 mg tablet Take 1 tablet (5 mg) by mouth once daily as needed for erectile dysfunction.  Patient not taking: Reported on 2024 4/4/24 3/30/25  Steven Horton MD       Allergies:  No Known Allergies    Past medical history:  Past Medical History:   Diagnosis Date    Arrhythmia     Asthma (HHS-HCC)     BPH (benign prostatic hyperplasia)     Heart valve disease     Pacemaker     medtronic    Peripheral neuropathy     Type 2 diabetes mellitus (Multi)     Vision loss        Surgical history:  Pacemaker 2018    Family history:  Non-contributory    Social history:   reports that he has never smoked. He has never used smokeless tobacco. He reports that he does not currently use alcohol. He reports that he does not use drugs.    Review of systems:  ROS negative except as noted per HPI.    Vitals:  24 Hour Vitals  Temp:  [36 °C (96.8 °F)] 36 °C (96.8 °F)  Heart Rate:  [67] 67  Resp:  [18] 18  BP: (166)/(85) 166/85    Temp (24hrs), Av °C (96.8 °F), Min:36 °C (96.8 °F), Max:36 °C (96.8 °F)     24 hour Intake/Output    Intake/Output Summary (Last 24 hours) at 2024 1126  Last data filed at 2024 1103  Gross per 24 hour   Intake 1500 ml   Output 600 ml   Net 900 ml          Physical exam:  Constitutional: Well-developed male in no acute distress.  HEENT: NCAT. EOMI.   Respiratory: CTAB. No wheezes, crackles, or rhonchi. Normal respiratory effort on 10L ventimask.  Cardiovascular: RRR, normal S1/S2, No murmurs, rubs, or gallops.   Abdominal: Soft, nondistended, nontender to palpation. No rebound or guarding  Neuro: CN II-XII grossly intact. Moving all extremities with no focal  deficits  MSK: WWP, no peripheral edema/ + b/l DP pulses. B/l groin access sites bandaged c/d/I, no hematoma.   Skin: No lesions, wounds, or bruising  Psych: Appropriate mood and affect.    Labs:  Results for orders placed or performed during the hospital encounter of 07/26/24 (from the past 24 hour(s))   POCT GLUCOSE   Result Value Ref Range    POCT Glucose 147 (H) 74 - 99 mg/dL   Type and Screen   Result Value Ref Range    ABO TYPE O     Rh TYPE POS     ANTIBODY SCREEN NEG    Verify ABO/Rh Group Test (VERAB)   Result Value Ref Range    ABO TYPE O     Rh TYPE POS    Anesthesia Intraoperative Transesophageal Echocardiogram   Result Value Ref Range    LV EF 60 %   Prepare RBC: 4 Units   Result Value Ref Range    PRODUCT CODE K6369K87     Unit Number [id number]     Unit ABO O     Unit RH POS     XM INTEP COMP     Dispense Status XM     Blood Expiration Date 8/21/2024 11:59:00 PM EDT     PRODUCT BLOOD TYPE 5100     UNIT VOLUME 287     PRODUCT CODE H0262T66     Unit Number [id number]     Unit ABO O     Unit RH POS     XM INTEP COMP     Dispense Status XM     Blood Expiration Date 8/24/2024 11:59:00 PM EDT     PRODUCT BLOOD TYPE 5100     UNIT VOLUME 314     PRODUCT CODE B2909I40     Unit Number [id number]     Unit ABO O     Unit RH POS     XM INTEP COMP     Dispense Status XM     Blood Expiration Date 8/24/2024 11:59:00 PM EDT     PRODUCT BLOOD TYPE 5100     UNIT VOLUME 329     PRODUCT CODE L3495F37     Unit Number [id number]     Unit ABO O     Unit RH POS     XM INTEP COMP     Dispense Status XM     Blood Expiration Date 8/24/2024 11:59:00 PM EDT     PRODUCT BLOOD TYPE 5100     UNIT VOLUME 321    ACTIVATED CLOTTING TIME LOW   Result Value Ref Range    POCT Activated Clotting Time Low Range 155 83 - 199 sec   POCT GLUCOSE   Result Value Ref Range    POCT Glucose 141 (H) 74 - 99 mg/dL       Imaging:               Assessment and plan:  Adams Moody Md is a 69 y.o. male with a hx of high grade AVB s/p  CRT P (2018 [facility]), HTN, NIDDM admitted to the CICU following successful RV lead extraction iso impending RV lead failure.     NEURO  HARMONY  PULM  HARMONY    CARDIOLOGY  #High grade AVB s/o CRT P (2018 [facility]), s/p RV lead extraction and replacement 7/26  ::Follows with Dr. Yip and Dr. Bourne  ::Intra-op YEN 60%  Plan:  Fup CXR  CXR on 7/27 and device interrogation   Keflex x1 week  Bedrest [id number].   Tylenol/oxy for pain control      GI  HARMONY  RENAL  HARMONY  HEME/ONC  HARMONY      ENDO  #NIDDM  ::hba1c 7.5 4/4/24   Plan:  SSI  Hold home metformin    ID  HARMONY  MSK/RHEUM  HARMONY  SKIN  HARMONY      F: PRN  E: PRN  N: PRN  Access/Tubes: PIVs, kendra  Antimicrobials: Keflex  DVT prophylaxis: Hold, restart tomorrow  GI prophylaxis: None  Oxygen: RA  Code status: FULL  NOK:Wife Criss ([phone])    Patient and plan discussed with attending physician.    Hazel Peters MD  PGY-2 Internal Medicine

## 2024-07-26 NOTE — ANESTHESIA PROCEDURE NOTES
Peripheral IV  Date/Time: 7/26/2024 7:56 AM  Inserted by: BLADIMIR Cerna    Placement  Needle size: 18 G  Laterality: left  Location: hand  Local anesthetic: none  Site prep: alcohol  Technique: anatomical landmarks  Attempts: 1

## 2024-07-26 NOTE — ANESTHESIA PREPROCEDURE EVALUATION
Patient: Adams Moody Md    Procedure Information       Date/Time: 07/26/24 0715    Procedures:       PPM Lead Extraction - RV lead extr/reimplant OR 27   Medtronic  Dr. Mathew covering      Pacemaker/AICD Lead Extraction Excimer Laser (Chest)    Location: [facility] OR 27 / [facility] OR    Surgeons: Gianfranco Bourne MD; Tamiko Mathew MD            Relevant Problems   Anesthesia (within normal limits)      Cardiac   (+) Cardiac arrhythmia   (+) Complete heart block (Multi)   (+) HTN (hypertension) (Pt denies, no med)   (+) Precordial pain      Pulmonary (within normal limits)      Neuro (within normal limits)      GI (within normal limits)      /Renal (within normal limits)      Liver (within normal limits)      Endocrine   (+) Type 2 diabetes mellitus with diabetic neuropathy, without long-term current use of insulin (Multi)      Hematology (within normal limits)      Musculoskeletal (within normal limits)      HEENT   (+) Hearing loss (Deaf in L ear)       Clinical information reviewed:   Tobacco  Allergies  Meds   Med Hx  Surg Hx   Fam Hx  Soc Hx        NPO Detail:  NPO/Void Status  Date of Last Liquid: 07/26/24  Time of Last Liquid: 0500  Date of Last Solid: 07/25/24  Time of Last Solid: 2300  Last Intake Type: Clear fluids         Physical Exam    Airway  Mallampati: II  TM distance: >3 FB  Neck ROM: full     Cardiovascular    Dental    Pulmonary    Abdominal            Anesthesia Plan    History of general anesthesia?: yes  History of complications of general anesthesia?: no    ASA 2     general   (GA, ETT, 2 PIV, [place], R/B discussed.  Questions welcomed.  Pt agrees with plan.)  Anesthetic plan and risks discussed with patient.  Use of blood products discussed with patient who consented to blood products.    Plan discussed with [facility] and attending.

## 2024-07-26 NOTE — ANESTHESIA POSTPROCEDURE EVALUATION
Patient: Adams Moody Md    Procedure Summary       Date: 07/26/24 Room / Location: [facility] OR 27 / [facility]    Anesthesia Start: 0720 Anesthesia Stop: 1112    Procedure: PPM Lead Extraction and reimplantation (Left: Chest) Diagnosis:       CHB (complete heart block) (Multi)      (Cardiac arrhythmia, unspecified [I49.9])    Surgeons: Gianfranco Bourne MD Responsible Provider: Kavin Basurto MD    Anesthesia Type: general ASA Status: 2            Anesthesia Type: general    Vitals Value Taken Time   /76 07/26/24 1112   Temp 36.2 07/26/24 1112   Pulse 50 07/26/24 1112   Resp 14 07/26/24 1112   SpO2 100 07/26/24 1112       Anesthesia Post Evaluation    Patient location during evaluation: ICU  Patient participation: complete - patient cannot participate  Level of consciousness: awake  Pain management: adequate  Airway patency: patent  Cardiovascular status: acceptable  Respiratory status: acceptable and face mask  Hydration status: acceptable  Postoperative Nausea and Vomiting: none        There were no known notable events for this encounter.

## 2024-07-26 NOTE — CARE PLAN
Problem: Pain - Adult  Goal: Verbalizes/displays adequate comfort level or baseline comfort level  Outcome: Progressing    Problem: Safety - Adult  Goal: Free from fall injury  Outcome: Progressing      The patient's goals for the shift include patient will remain comfortable with current pain regimen.     The clinical goals for the shift include HDS.

## 2024-07-26 NOTE — ANESTHESIA PROCEDURE NOTES
Airway  Date/Time: 7/26/2024 7:43 AM  Urgency: elective    Airway not difficult    Staffing  Performed: BLADIMIR   Authorized by: Kavin Basurto MD    Performed by: BLADIMIR Cerna  Patient location during procedure: OR    Indications and Patient Condition  Indications for airway management: anesthesia and airway protection  Spontaneous ventilation: present  Sedation level: deep  Preoxygenated: yes  Patient position: sniffing  MILS not maintained throughout  Mask difficulty assessment: 1 - vent by mask    Final Airway Details  Final airway type: endotracheal airway      Successful airway: ETT  Cuffed: yes   Successful intubation technique: direct laryngoscopy  Blade: Ana  Blade size: #4  ETT size (mm): 7.5  Cormack-Lehane Classification: grade IIb - view of arytenoids or posterior of glottis only  Placement verified by: chest auscultation and capnometry   Measured from: lips  ETT to lips (cm): 22  Number of attempts at approach: 1    Additional Comments  Secured with silk tape

## 2024-07-26 NOTE — ANESTHESIA PROCEDURE NOTES
Arterial Line:    Date/Time: 7/26/2024 7:53 AM    Staffing  Performed: CAA   Authorized by: Kavin Basurto MD    Performed by: BLADIMIR Cerna    An arterial line was placed. Procedure performed using ultrasound guidance.in the OR for the following indication(s): continuous blood pressure monitoring.    A 20 gauge (size) (length), Angiocath (type) catheter was placed into the Left radial artery, secured by Tegaderm,   Seldinger technique used.  Events:  patient tolerated procedure well with no complications.      Additional notes:  Chloraprep, sterile gloves, wire used and removed, good blood return, good waveform, 1 attempt

## 2024-07-27 ENCOUNTER — PHARMACY VISIT (OUTPATIENT)
Dept: PHARMACY | Facility: CLINIC | Age: 69
End: 2024-07-27
Payer: COMMERCIAL

## 2024-07-27 ENCOUNTER — APPOINTMENT (OUTPATIENT)
Dept: RADIOLOGY | Facility: HOSPITAL | Age: 69
End: 2024-07-27
Payer: MEDICARE

## 2024-07-27 VITALS
TEMPERATURE: 97.3 F | HEART RATE: 78 BPM | SYSTOLIC BLOOD PRESSURE: 135 MMHG | DIASTOLIC BLOOD PRESSURE: 72 MMHG | OXYGEN SATURATION: 92 % | WEIGHT: 200.8 LBS | BODY MASS INDEX: 28.75 KG/M2 | RESPIRATION RATE: 23 BRPM | HEIGHT: 70 IN

## 2024-07-27 LAB
ALBUMIN SERPL BCP-MCNC: 3.3 G/DL (ref 3.4–5)
ANION GAP SERPL CALC-SCNC: 10 MMOL/L (ref 10–20)
BASOPHILS # BLD AUTO: 0.03 X10*3/UL (ref 0–0.1)
BASOPHILS NFR BLD AUTO: 0.5 %
BUN SERPL-MCNC: 21 MG/DL (ref 6–23)
CALCIUM SERPL-MCNC: 8.4 MG/DL (ref 8.6–10.6)
CHLORIDE SERPL-SCNC: 103 MMOL/L (ref 98–107)
CO2 SERPL-SCNC: 30 MMOL/L (ref 21–32)
CREAT SERPL-MCNC: 1.11 MG/DL (ref 0.5–1.3)
EGFRCR SERPLBLD CKD-EPI 2021: 71 ML/MIN/1.73M*2
EOSINOPHIL # BLD AUTO: 0.17 X10*3/UL (ref 0–0.7)
EOSINOPHIL NFR BLD AUTO: 2.6 %
ERYTHROCYTE [DISTWIDTH] IN BLOOD BY AUTOMATED COUNT: 11.9 % (ref 11.5–14.5)
GLUCOSE BLD MANUAL STRIP-MCNC: 172 MG/DL (ref 74–99)
GLUCOSE SERPL-MCNC: 147 MG/DL (ref 74–99)
HCT VFR BLD AUTO: 37.2 % (ref 41–52)
HGB BLD-MCNC: 12.8 G/DL (ref 13.5–17.5)
IMM GRANULOCYTES # BLD AUTO: 0.01 X10*3/UL (ref 0–0.7)
IMM GRANULOCYTES NFR BLD AUTO: 0.2 % (ref 0–0.9)
LYMPHOCYTES # BLD AUTO: 1.4 X10*3/UL (ref 1.2–4.8)
LYMPHOCYTES NFR BLD AUTO: 21.5 %
MAGNESIUM SERPL-MCNC: 1.85 MG/DL (ref 1.6–2.4)
MCH RBC QN AUTO: 28.5 PG (ref 26–34)
MCHC RBC AUTO-ENTMCNC: 34.4 G/DL (ref 32–36)
MCV RBC AUTO: 83 FL (ref 80–100)
MONOCYTES # BLD AUTO: 0.71 X10*3/UL (ref 0.1–1)
MONOCYTES NFR BLD AUTO: 10.9 %
NEUTROPHILS # BLD AUTO: 4.19 X10*3/UL (ref 1.2–7.7)
NEUTROPHILS NFR BLD AUTO: 64.3 %
NRBC BLD-RTO: 0 /100 WBCS (ref 0–0)
PHOSPHATE SERPL-MCNC: 3.6 MG/DL (ref 2.5–4.9)
PLATELET # BLD AUTO: 143 X10*3/UL (ref 150–450)
POTASSIUM SERPL-SCNC: 3.8 MMOL/L (ref 3.5–5.3)
RBC # BLD AUTO: 4.49 X10*6/UL (ref 4.5–5.9)
SODIUM SERPL-SCNC: 139 MMOL/L (ref 136–145)
WBC # BLD AUTO: 6.5 X10*3/UL (ref 4.4–11.3)

## 2024-07-27 PROCEDURE — 2500000001 HC RX 250 WO HCPCS SELF ADMINISTERED DRUGS (ALT 637 FOR MEDICARE OP): Performed by: STUDENT IN AN ORGANIZED HEALTH CARE EDUCATION/TRAINING PROGRAM

## 2024-07-27 PROCEDURE — 99233 SBSQ HOSP IP/OBS HIGH 50: CPT | Performed by: STUDENT IN AN ORGANIZED HEALTH CARE EDUCATION/TRAINING PROGRAM

## 2024-07-27 PROCEDURE — 71046 X-RAY EXAM CHEST 2 VIEWS: CPT

## 2024-07-27 PROCEDURE — 2500000004 HC RX 250 GENERAL PHARMACY W/ HCPCS (ALT 636 FOR OP/ED)

## 2024-07-27 PROCEDURE — 83735 ASSAY OF MAGNESIUM: CPT

## 2024-07-27 PROCEDURE — 82947 ASSAY GLUCOSE BLOOD QUANT: CPT

## 2024-07-27 PROCEDURE — 7100000011 HC EXTENDED STAY RECOVERY HOURLY - NURSING UNIT

## 2024-07-27 PROCEDURE — 85025 COMPLETE CBC W/AUTO DIFF WBC: CPT

## 2024-07-27 PROCEDURE — 71046 X-RAY EXAM CHEST 2 VIEWS: CPT | Performed by: RADIOLOGY

## 2024-07-27 PROCEDURE — 99239 HOSP IP/OBS DSCHRG MGMT >30: CPT

## 2024-07-27 PROCEDURE — 2500000001 HC RX 250 WO HCPCS SELF ADMINISTERED DRUGS (ALT 637 FOR MEDICARE OP)

## 2024-07-27 PROCEDURE — 37799 UNLISTED PX VASCULAR SURGERY: CPT

## 2024-07-27 PROCEDURE — 2500000002 HC RX 250 W HCPCS SELF ADMINISTERED DRUGS (ALT 637 FOR MEDICARE OP, ALT 636 FOR OP/ED)

## 2024-07-27 PROCEDURE — 80069 RENAL FUNCTION PANEL: CPT

## 2024-07-27 PROCEDURE — RXMED WILLOW AMBULATORY MEDICATION CHARGE

## 2024-07-27 RX ORDER — HYDRALAZINE HYDROCHLORIDE 25 MG/1
25 TABLET, FILM COATED ORAL ONCE
Status: COMPLETED | OUTPATIENT
Start: 2024-07-27 | End: 2024-07-27

## 2024-07-27 RX ORDER — MAGNESIUM SULFATE HEPTAHYDRATE 40 MG/ML
2 INJECTION, SOLUTION INTRAVENOUS ONCE
Status: COMPLETED | OUTPATIENT
Start: 2024-07-27 | End: 2024-07-27

## 2024-07-27 RX ORDER — CEPHALEXIN 500 MG/1
500 CAPSULE ORAL EVERY 8 HOURS
Qty: 21 CAPSULE | Refills: 0 | Status: SHIPPED | OUTPATIENT
Start: 2024-07-27 | End: 2024-08-03

## 2024-07-27 RX ADMIN — MAGNESIUM SULFATE HEPTAHYDRATE 2 G: 40 INJECTION, SOLUTION INTRAVENOUS at 06:54

## 2024-07-27 RX ADMIN — INSULIN LISPRO 2 UNITS: 100 INJECTION, SOLUTION INTRAVENOUS; SUBCUTANEOUS at 08:29

## 2024-07-27 RX ADMIN — CEPHALEXIN 500 MG: 500 CAPSULE ORAL at 05:57

## 2024-07-27 RX ADMIN — HYDRALAZINE HYDROCHLORIDE 25 MG: 25 TABLET ORAL at 05:57

## 2024-07-27 ASSESSMENT — PAIN - FUNCTIONAL ASSESSMENT
PAIN_FUNCTIONAL_ASSESSMENT: 0-10

## 2024-07-27 ASSESSMENT — PAIN SCALES - GENERAL
PAINLEVEL_OUTOF10: 0 - NO PAIN
PAINLEVEL_OUTOF10: 1
PAINLEVEL_OUTOF10: 0 - NO PAIN

## 2024-07-27 NOTE — CARE PLAN
The patient's goals for the shift include resting comfortably.    The clinical goals for the shift include will be discharged in stable condition.    Over the shift, the patient did not make progress toward the following goals. Barriers to progression include none. Recommendations to address these barriers include continue with follow up appointments.

## 2024-07-27 NOTE — PROGRESS NOTES
Subjective Data:  Feels ok, minimal soreness at device site, hoping to go home soon       Objective Data:  Last Recorded Vitals:  Vitals:    07/27/24 0832 07/27/24 0900 07/27/24 1000 07/27/24 1100   BP:   127/77 135/72   Pulse:  79 83 78   Resp:  23 16 23   Temp: 36.3 °C (97.3 °F)      TempSrc:       SpO2:  92% 95% 92%   Weight:       Height:           Last Labs:  CBC - 7/27/2024:  1:26 AM  6.5 12.8 143    37.2      CMP - 7/27/2024:  1:26 AM  8.4 5.7 22 --- 0.7   3.6 3.3 21 65      PTT - No results in last year.  1.0   11.8 _     HGBA1C   Date/Time Value Ref Range Status   04/04/2024 02:59 PM 7.5 see below % Final     LDLCALC   Date/Time Value Ref Range Status   04/04/2024 02:59 PM 88 <=99 mg/dL Final     Comment:                                 Near   Borderline      AGE      Desirable  Optimal    High     High     Very High     0-19 Y     0 - 109     ---    110-129   >/= 130     ----    20-24 Y     0 - 119     ---    120-159   >/= 160     ----      >24 Y     0 -  99   100-129  130-159   160-189     >/=190       VLDL   Date/Time Value Ref Range Status   04/04/2024 02:59 PM 35 0 - 40 mg/dL Final      Last I/O:  I/O last 3 completed shifts:  In: 2000 (22 mL/kg) [P.O.:450; I.V.:1350 (14.8 mL/kg); IV Piggyback:200]  Out: 1648 (18.1 mL/kg) [Urine:1648 (0.5 mL/kg/hr)]  Weight: 91.1 kg     Ejection Fractions:  EF   Date/Time Value Ref Range Status   07/26/2024 07:20 AM 60 %          Inpatient Medications:  Scheduled medications   Medication Dose Route Frequency    cephalexin  500 mg oral q8h ABDI    insulin lispro  0-10 Units subcutaneous TID     PRN medications   Medication    acetaminophen    Or    acetaminophen    Or    acetaminophen    dextrose    dextrose    glucagon    glucagon    oxyCODONE     Continuous Medications   Medication Dose Last Rate       Physical Exam:    Telemetry - atrial sensed - rhythm, no significant arrhythmias    Left pectoral site - skin edges well approximated, steri strips in place, no pocket  hematoma, minimal ecchymosis at skin edge    Bilateral groins - no ecchymosis or hematoma    Physical Exam:  Constitutional: well appearing, no distress  Eyes: no conjunctival injection  ENT: moist mucous , no apparent injury  Respiratory/Thorax: normal work of breathing on room air  Cardiovascular: normal rate, regular rhythm, extremities warm, JVP not elevated  Extremities: warm, intact     Assessment/Plan     69M neurologist hx HTN, 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    #high grade AVB s/p MDT CRT-P at [facility] 2018 c/b RV lead failure now s/p RV lead extraction and new RV lead implant 7/26    Tolerated procedure well, skin incision and device check and CXR and groin access site acceptable today, can discharge from EP standpoint.    -discharge today  -keflex x7d  -counseled re: activity restrictions post-pacemaker, post lead extraction         Thank you for the opportunity to contribute to the care of this patient. Above recommendations discussed with Dr. Capps. If further questions arise, please page the EP consult pager at [phone] on weekdays 7AM - 6PM and weekends 7AM - 2PM, or at [phone] at all other times. The EP device nurse can be reached at pager [phone] during regular business hours M-F.    Code Status:  Full Code    I spent 25 minutes in the professional and overall care of this patient.        Jame Cortes MD

## 2024-07-27 NOTE — DISCHARGE SUMMARY
Discharge Diagnosis  Complete heart block (Multi)    Issues Requiring Follow-Up  Follow up at device clinic    Discharge Meds     Your medication list        START taking these medications        Instructions Last Dose Given Next Dose Due   cephalexin 500 mg capsule  Commonly known as: Keflex      Take 1 capsule (500 mg) by mouth every 8 hours for 7 days.              CONTINUE taking these medications        Instructions Last Dose Given Next Dose Due   aspirin 81 mg EC tablet           loratadine 10 mg tablet  Commonly known as: Claritin           metFORMIN 500 mg tablet  Commonly known as: Glucophage      Take 1 tablet (500 mg) by mouth once daily with breakfast AND 2 tablets (1,000 mg) once daily in the evening. Take with meals.              ASK your doctor about these medications        Instructions Last Dose Given Next Dose Due   tadalafil 5 mg tablet  Commonly known as: Cialis      Take 1 tablet (5 mg) by mouth once daily as needed for erectile dysfunction.                 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  RV lead successfully extracted and replaced and battery exchanged. Post-replacement course uneventful. Intra-op YEN with LVEF of 60%, negative for effusion. Pt discharged to home with instructions to complete 7 day course of Keflex and follow up in device clinic.     Pertinent Physical Exam At Time of Discharge  Vitals:    07/27/24 1000   BP: 127/77    Pulse: 83   Resp: 16   Temp:    SpO2: 95%     Constitutional: Well-developed male in no acute distress.  HEENT: NCAT. EOMI.   Respiratory: CTAB. No wheezes, crackles, or rhonchi. Normal respiratory effort on RA.  Cardiovascular: RRR, normal S1/S2, No murmurs, rubs, or gallops.   Neuro: CN II-XII grossly intact. Moving all extremities with no focal deficits  MSK: WWP, no peripheral edema/ + b/l DP pulses. B/l groin access sites bandaged c/d/I, no hematoma. Pocket site without hematoma, bandage c/d/I,  Skin: No lesions, wounds, or bruising  Psych: Appropriate mood and affect.    Outpatient Follow-Up  Future Appointments   Date Time Provider Department Center   8/28/2024  3:00 PM MINOFF [facility] CARDIAC DEVICE CLINIC [id number] [facility] [place] [facility]   10/10/2024 12:00 PM AHU CT 1 AHUCT U Rad         Hazel Peters MD

## 2024-07-27 NOTE — DISCHARGE INSTRUCTIONS
Discharge Instructions for your Cardiac Device   CARDIAC DEVICE CLINIC  [phone]              Incision:   1.  Keep your incision clean and dry for 1 week.  2. May shower 7 days after the procedure. Do not submerge the incision in a tub, pool, hot tub, or lake for 4 weeks.  3. Your incision should look better each day. If you notice unusual swelling, redness, drainage or fever greater than 100 degrees, please call the Device Clinic or your Doctor's office.  4. Avoid using deodorants, powders, creams, lotions, etc on your incision for 4 weeks.   5. There are no stitches to be removed.  If you received a “glue” closure this may appear purple-gray and does not get removed but wears away slowly on its own.  Steri-strips (small white bandages) may be removed in one week or they may fall off on their own earlier.    Pain:  1. It is normal for the area around the incision to be tender for a few weeks following surgery. Pain relievers such as Tylenol or Motrin (whichever you can take) are usually sufficient for pain relief. If the pain gets worse instead of better than please call the Device Clinic or your Doctor.    Activity:  1. If you have a new device and new leads placed than avoid raising your arm above shoulder level for 4 weeks. Do no  anything weighing more than 15 pounds.   2. Avoid exercising with the arm on the side of your pacemaker.  So no golf, swimming, tennis, bowling etc for 4 weeks.  3. No lifting anything heavier than 10 pounds        3. Driving: If you were driving prior to your procedure, you may resume driving in 1 week. If you experienced a loss of consciousness prior to your procedure, you should verify with your Doctor when you are able to resume driving.    ID CARD:  1. It is important to carry your device ID card with you at all times.   2. Inform doctors and health care providers that you have a pacemaker or defibrillator.    Electromagnetic Interference:  1. Microwave ovens are safe  to use.  2. Cellular phones should be held to the opposite ear from your device. Do not carry your phone in your shirt pocket. Some i-phones that self-charge can interfere with your device so be sure to keep it away from your pacemaker/defibrillator.   3. Read the Patient Booklet for more information. You may call either the Device Clinic [phone]  or the patient services of the device  with questions about specific electrical appliances and interference problems.    IT IS YOUR RESPONSIBILITY TO MAKE AND KEEP APPOINTMENTS.   Please refer to your Device clinic handout.      INSTRUCTIONS AFTER LEAD EXTRACTION:    * In the first week after extraction you should take it easy. No heavy lifting or heavy exertion, no treadmill.  You can use the stairs if needed but go slowly and minimize the number of times up and down.    * Some minor bruising is common at each groin access site with minor soreness as if you had banged the area. Bruising may occasionally be seen to extend down the leg. This is normal as is an occasional small quarter sized bump in the area. If larger swelling or more significant pain occurs at the area, please contact the office or go the nearest Emergency Room.    * All medications will generally remain the same unless you are told otherwise.  Resume taking your home medications today as listed on the discharge instructions.    *Continue to follow up with your primary cardiologist, primary care physician, and any other specialists you normally see.    *No driving for 2 days post procedure (IF you were driving prior to procedure).    *Diet: Heart healthy    Call Provider If:  Breathing faster than normal.     Fever of 100.4 F (38 C) or higher.     Chills.     Any new concerning symptoms.     Passing out.     Patient Instructions, Next 24 hours:  DO NOT drive a car, operate machinery or power tools.  It is recommended that a responsible adult be with you for the first 24 hours.     DO NOT  drink any alcoholic drinks or take any non-prescriptive medications that contain alcohol for the first 24 hours.     DO NOT make any important decisions for the first 24 hours.    Activity:  You are advised to go directly home from the hospital.     DO NOT lift anything heavier than 10 pounds for one week, this allows for proper healing of the groin.     No excessive exercise or treadmill use for one week. You may walk and do stairs, slowly.     No sexual activities for 24 hours after you arrive home.    Wound Care:  If slight bleeding should occur at site, lie down and have someone apply firm pressure just above the puncture site for 5 minutes.  If it continues or is profuse, call 911. Always notify your doctor if bleeding occurs.     Keep site clean and dry. Let air dry or you may use a simple bandaid.     Gently cleanse the puncture site in your groin with soap and water only.     You may experience some tenderness, bruising or minimal inflammation.  If you have any concerns, you may contact the Cath Lab or if any of these symptoms become excessive, contact your cardiologist or go to the emergency room.     No tub baths, soaking, or swimming for one week.     May shower the next day after your procedure.    If you have any questions about the effects of the sedative drugs or groin care, call the physician who performed your procedure.

## 2024-07-29 ENCOUNTER — TELEPHONE (OUTPATIENT)
Dept: CARDIOVASCULAR ICU | Facility: HOSPITAL | Age: 69
End: 2024-07-29

## 2024-07-29 ENCOUNTER — PATIENT OUTREACH (OUTPATIENT)
Dept: PRIMARY CARE | Facility: CLINIC | Age: 69
End: 2024-07-29
Payer: MEDICARE

## 2024-07-29 LAB
BLOOD EXPIRATION DATE: NORMAL
DISPENSE STATUS: NORMAL
PRODUCT BLOOD TYPE: 5100
PRODUCT CODE: NORMAL
UNIT ABO: NORMAL
UNIT NUMBER: NORMAL
UNIT RH: NORMAL
UNIT VOLUME: 287
UNIT VOLUME: 314
UNIT VOLUME: 321
UNIT VOLUME: 329
XM INTEP: NORMAL

## 2024-07-29 NOTE — PROGRESS NOTES
Outreach made for post discharge follow up. Spoke with patient's spouse. Patient unavailable. Per spouse, the patient is doing well. Declined to schedule hospital follow up at this time. The patient will reach out to Dr. Horton as needed. TCM program closed.     Discharge Facility: [facility]  Discharge Diagnosis: Complete heart block   Admission Date: 7/26/2024  Procedure Date: 7/26/2024 PPM Lead Extraction - RV lead extr/reimplant   Discharge Date: 7/27/2024    PCP Appointment Date: Declined to schedule  Specialist Appointment Date: 8/28/2024 3:00 PM Device Clinic  Hospital Encounter and Summary Linked: Yes

## 2024-08-01 LAB
LABORATORY COMMENT REPORT: NORMAL
PATH REPORT.FINAL DX SPEC: NORMAL
PATH REPORT.GROSS SPEC: NORMAL
PATH REPORT.RELEVANT HX SPEC: NORMAL
PATH REPORT.TOTAL CANCER: NORMAL

## 2024-08-26 DIAGNOSIS — I44.2 COMPLETE HEART BLOCK (MULTI): ICD-10-CM

## 2024-08-26 DIAGNOSIS — Z95.0 PACEMAKER: Primary | ICD-10-CM

## 2024-10-10 ENCOUNTER — HOSPITAL ENCOUNTER (OUTPATIENT)
Dept: RADIOLOGY | Facility: HOSPITAL | Age: 69
Discharge: HOME | End: 2024-10-10
Payer: MEDICARE

## 2024-10-10 DIAGNOSIS — E78.5 DYSLIPIDEMIA: ICD-10-CM

## 2024-10-10 PROCEDURE — 75571 CT HRT W/O DYE W/CA TEST: CPT

## 2024-11-11 DIAGNOSIS — E78.5 DYSLIPIDEMIA: Primary | ICD-10-CM

## 2024-11-11 RX ORDER — ROSUVASTATIN CALCIUM 20 MG/1
20 TABLET, COATED ORAL DAILY
Qty: 90 TABLET | Refills: 3 | Status: SHIPPED | OUTPATIENT
Start: 2024-11-11 | End: 2025-11-11

## 2024-12-19 ENCOUNTER — APPOINTMENT (OUTPATIENT)
Dept: PRIMARY CARE | Facility: CLINIC | Age: 69
End: 2024-12-19
Payer: MEDICARE

## 2024-12-19 ENCOUNTER — LAB (OUTPATIENT)
Dept: LAB | Facility: LAB | Age: 69
End: 2024-12-19
Payer: MEDICARE

## 2024-12-19 VITALS
HEIGHT: 70 IN | TEMPERATURE: 97.8 F | HEART RATE: 64 BPM | DIASTOLIC BLOOD PRESSURE: 78 MMHG | BODY MASS INDEX: 28.06 KG/M2 | RESPIRATION RATE: 20 BRPM | SYSTOLIC BLOOD PRESSURE: 132 MMHG | WEIGHT: 196 LBS

## 2024-12-19 DIAGNOSIS — Z00.00 MEDICARE ANNUAL WELLNESS VISIT, SUBSEQUENT: Primary | ICD-10-CM

## 2024-12-19 DIAGNOSIS — E78.5 DYSLIPIDEMIA: ICD-10-CM

## 2024-12-19 DIAGNOSIS — E11.69 TYPE 2 DIABETES MELLITUS WITH OTHER SPECIFIED COMPLICATION, WITHOUT LONG-TERM CURRENT USE OF INSULIN: ICD-10-CM

## 2024-12-19 DIAGNOSIS — E03.8 SUBCLINICAL HYPOTHYROIDISM: ICD-10-CM

## 2024-12-19 DIAGNOSIS — Z13.6 ENCOUNTER FOR ABDOMINAL AORTIC ANEURYSM (AAA) SCREENING: ICD-10-CM

## 2024-12-19 LAB
ALBUMIN SERPL BCP-MCNC: 4.4 G/DL (ref 3.4–5)
ALP SERPL-CCNC: 78 U/L (ref 33–136)
ALT SERPL W P-5'-P-CCNC: 15 U/L (ref 10–52)
ANION GAP SERPL CALC-SCNC: 11 MMOL/L (ref 10–20)
AST SERPL W P-5'-P-CCNC: 19 U/L (ref 9–39)
BILIRUB SERPL-MCNC: 0.6 MG/DL (ref 0–1.2)
BUN SERPL-MCNC: 22 MG/DL (ref 6–23)
CALCIUM SERPL-MCNC: 9.7 MG/DL (ref 8.6–10.6)
CHLORIDE SERPL-SCNC: 100 MMOL/L (ref 98–107)
CO2 SERPL-SCNC: 33 MMOL/L (ref 21–32)
CREAT SERPL-MCNC: 0.92 MG/DL (ref 0.5–1.3)
EGFRCR SERPLBLD CKD-EPI 2021: 89 ML/MIN/1.73M*2
EST. AVERAGE GLUCOSE BLD GHB EST-MCNC: 151 MG/DL
GLUCOSE SERPL-MCNC: 102 MG/DL (ref 74–99)
HBA1C MFR BLD: 6.9 %
MAGNESIUM SERPL-MCNC: 2.17 MG/DL (ref 1.6–2.4)
PHOSPHATE SERPL-MCNC: 3.4 MG/DL (ref 2.5–4.9)
POTASSIUM SERPL-SCNC: 5 MMOL/L (ref 3.5–5.3)
PROT SERPL-MCNC: 7.1 G/DL (ref 6.4–8.2)
SODIUM SERPL-SCNC: 139 MMOL/L (ref 136–145)
TSH SERPL-ACNC: 2.94 MIU/L (ref 0.44–3.98)

## 2024-12-19 PROCEDURE — 3008F BODY MASS INDEX DOCD: CPT | Performed by: STUDENT IN AN ORGANIZED HEALTH CARE EDUCATION/TRAINING PROGRAM

## 2024-12-19 PROCEDURE — 1159F MED LIST DOCD IN RCRD: CPT | Performed by: STUDENT IN AN ORGANIZED HEALTH CARE EDUCATION/TRAINING PROGRAM

## 2024-12-19 PROCEDURE — 1123F ACP DISCUSS/DSCN MKR DOCD: CPT | Performed by: STUDENT IN AN ORGANIZED HEALTH CARE EDUCATION/TRAINING PROGRAM

## 2024-12-19 PROCEDURE — 36415 COLL VENOUS BLD VENIPUNCTURE: CPT

## 2024-12-19 PROCEDURE — 84100 ASSAY OF PHOSPHORUS: CPT

## 2024-12-19 PROCEDURE — 99214 OFFICE O/P EST MOD 30 MIN: CPT | Performed by: STUDENT IN AN ORGANIZED HEALTH CARE EDUCATION/TRAINING PROGRAM

## 2024-12-19 PROCEDURE — G0439 PPPS, SUBSEQ VISIT: HCPCS | Performed by: STUDENT IN AN ORGANIZED HEALTH CARE EDUCATION/TRAINING PROGRAM

## 2024-12-19 PROCEDURE — 3075F SYST BP GE 130 - 139MM HG: CPT | Performed by: STUDENT IN AN ORGANIZED HEALTH CARE EDUCATION/TRAINING PROGRAM

## 2024-12-19 PROCEDURE — 83735 ASSAY OF MAGNESIUM: CPT

## 2024-12-19 PROCEDURE — 83036 HEMOGLOBIN GLYCOSYLATED A1C: CPT

## 2024-12-19 PROCEDURE — 1170F FXNL STATUS ASSESSED: CPT | Performed by: STUDENT IN AN ORGANIZED HEALTH CARE EDUCATION/TRAINING PROGRAM

## 2024-12-19 PROCEDURE — 84443 ASSAY THYROID STIM HORMONE: CPT

## 2024-12-19 PROCEDURE — 3078F DIAST BP <80 MM HG: CPT | Performed by: STUDENT IN AN ORGANIZED HEALTH CARE EDUCATION/TRAINING PROGRAM

## 2024-12-19 PROCEDURE — 80053 COMPREHEN METABOLIC PANEL: CPT

## 2024-12-19 RX ORDER — METFORMIN HYDROCHLORIDE 750 MG/1
1500 TABLET, EXTENDED RELEASE ORAL
Qty: 180 TABLET | Refills: 3 | Status: SHIPPED | OUTPATIENT
Start: 2024-12-19 | End: 2025-12-19

## 2024-12-19 RX ORDER — DAPAGLIFLOZIN 10 MG/1
10 TABLET, FILM COATED ORAL DAILY
Qty: 100 TABLET | Refills: 3 | Status: SHIPPED | OUTPATIENT
Start: 2024-12-19 | End: 2026-01-23

## 2024-12-19 RX ORDER — ROSUVASTATIN CALCIUM 20 MG/1
20 TABLET, COATED ORAL DAILY
Qty: 90 TABLET | Refills: 3 | Status: SHIPPED | OUTPATIENT
Start: 2024-12-19 | End: 2025-12-19

## 2024-12-19 NOTE — PATIENT INSTRUCTIONS
Thank you for coming in today!    New Medications:  - Rosuvastatin 20mg once daily (for cholesterol and elevated CT cardiac scoring)   - Farxiga 10mg once daily (for diabetes)   - Metformin XR 750mg two tablets daily     Shingles shot through your pharmacy     OK to continue Aspirin 81mg daily for now     AAA screening   Call [phone] or stop in [address] to schedule     Labs today in [address]  Repeat labs in ~6 weeks (late January early February) after starting the Farxiga and Rosuvastatin     Follow up with me in 4-6 months; would repeat A1C at that time    Best,  Dr. VOSS

## 2024-12-19 NOTE — PROGRESS NOTES
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 Steven Horton on 12/19/24 for routine care, as well as for management of the following chronic medical conditions: history of elevated blood pressure, high grade AVB s/p CRT-P (2018 [facility]), T2DM, DLD, R Foot Injury (s/p Open Reduction 10/2023). Patient presents today for CPE/MCW visit.     He has been leading physically active lifestyle with swimming daily. He has been compliant with all medications. Has not been monitoring blood glucose at home. Due for updated labs. On that assessment, his A1C is improved, now down <7% (7.5%-->6.9% in last year) on Metformin 1500mg daily. Will trial SGLT-2 with Farxiga as well.     He is not currently on statin with LDL >70, which would be goal given his T2DM status. He has historically maintained LDL <100 with lifestyle management alone. CAC scoring for further risk stratification completed 2024 with result 169. We will start Rosuvastatin 20mg once daily in interim and assess repeat labs in 6-8 weeks. He is on ASA 81mg daily, OK to continue for now given no recent falls or trauma.     #History of Elevated Blood Pressure   - BP in office today 132/78 on manual assessment   [  ] ACE/ARB if needed in future      #High Grade AVB s/p CRT-P (2018 [facility])  - syncope with exertion, thus symptomatic   - seen by Dr. Virgen/Dr. Pace  - most recently seen by EP at ; admission for lead extraction     #T2DM   #DLD  - Metformin as lone agent (500mg in AM, 1000mg PM); transition to XR formulation, continue 1500mg daily dosing   [  ] Farxiga trial   [x] labs today: 6.9%  [x] urine albumin: normal   [  ] normal cholesterol, not on statin currently (LDL 88), CAC scoring with result 169 in 2024; Rosuva 20mg daily trial  [  ] currently takes ASA 81 daily; OK to continue for now, continue to reassess    #R Foot Injury s/p Open Reduction R 2/3/4 MTPJ with pinning of second Weil osteotomy and PIPJ arthrodesis (10/19/2023)   - clinical and  radiographic healing at time of last visit   - compression stockings advised   - Dr. Melo had discussed the potential for metatarsal pad versus  with metatarsal head recession and padding to address his plantar foot pain, the patient had deferred     Past Medical History: as above   Past Medical History:   Diagnosis Date    Arrhythmia     Asthma (HHS-HCC)     BPH (benign prostatic hyperplasia)     Heart valve disease     Pacemaker     medtronic    Peripheral neuropathy     Type 2 diabetes mellitus (Multi)     Vision loss      Subspecialty Medical Care: Ortho, Optho, EP, Audiology     Past Surgical History: orthopedic surgery as above, PPM   Past Surgical History:   Procedure Laterality Date    CARDIAC ELECTROPHYSIOLOGY PROCEDURE Left 7/26/2024    Procedure: PPM Lead Extraction and reimplantation;  Surgeon: Gianfranco Bourne MD;  Location: [facility];  Service: Electrophysiology;  Laterality: Left;       Medications:  Current Outpatient Medications:     aspirin 81 mg EC tablet, Take 1 tablet (81 mg) by mouth once daily., Disp: , Rfl:     dapagliflozin propanediol (Farxiga) 10 mg, Take 1 tablet (10 mg) by mouth once daily., Disp: 100 tablet, Rfl: 3    loratadine (Claritin) 10 mg tablet, Take 1 tablet (10 mg) by mouth if needed for allergies., Disp: , Rfl:     metFORMIN XR (Glucophage-XR) 750 mg 24 hr tablet, Take 2 tablets (1,500 mg) by mouth once daily in the evening. Take with meals. Do not crush, chew, or split., Disp: 180 tablet, Rfl: 3    rosuvastatin (Crestor) 20 mg tablet, Take 1 tablet (20 mg) by mouth once daily., Disp: 90 tablet, Rfl: 3  Pharmacy: [facility] ([place], [place])     Allergies: NKDA   No Known Allergies    Immunizations:   - Flu advised annually, UTD 2024  - COVID vaccinated; recommend staying UTD with boosters   - RSV: recommended   - last Tdap unknown   - PCV-20: UTD 2024   - Shingrix: recommended    Family History:   No family history on file.    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)   Activities: swimming daily   Drug Use: non-smoker   Diet: T2DM conscious diet   Depression/Anxiety: no concerns today   Sexuality/Contraception/Menstrual History:    Sleep: no major sleep concerns     Patient Information:  Health Insurance: medicare   Transportation: [place]   Healthcare POA/Guardian: wife, emergency contact   Contact Information:     Visit Vitals  /78 (BP Location: Right arm, Patient Position: Sitting, BP Cuff Size: Adult)   Pulse 64   Temp 36.6 °C (97.8 °F) (Oral)   Resp 20   Ht 1.778 m (5' 10\")   Wt 88.9 kg (196 lb)   BMI 28.12 kg/m²   Smoking Status Never   BSA 2.1 m²      PHYSICAL EXAM:   General: well appearing, NAD, pleasant and engaged in encounter    HEENT: NCAT, MMM  CV: RRR, no m/r/g  PULM: CTAB, non-labored respirations   ABD: soft, NT, ND  : no suprapubic tenderness   EXT: WWP, no significant edema   SKIN: no rashes noted   NEURO: A&Ox4, symmetric facies, no gross motor or sensory deficits, normal gait  PSYCH: pleasant mood, appropriate affect     Assessment/Plan    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 Steven Horton on 12/19/24 for routine care, as well as for management of the following chronic medical conditions: history of elevated blood pressure, high grade AVB s/p CRT-P (2018 [facility]), T2DM, DLD, R Foot Injury (s/p Open Reduction 10/2023). Patient presents today for CPE/MCW visit.     He has been leading physically active lifestyle with swimming daily. He has been compliant with all medications. Has not been monitoring blood glucose at home. Due for updated labs. On that assessment, his A1C is improved, now down <7% (7.5%-->6.9% in last year) on Metformin 1500mg daily. Will trial SGLT-2 with Farxiga as " well.     He is not currently on statin with LDL >70, which would be goal given his T2DM status. He has historically maintained LDL <100 with lifestyle management alone. CAC scoring for further risk stratification completed 2024 with result 169. We will start Rosuvastatin 20mg once daily in interim and assess repeat labs in 6-8 weeks. He is on ASA 81mg daily, OK to continue for now given no recent falls or trauma.     #History of Elevated Blood Pressure   - BP in office today 132/78 on manual assessment   [  ] ACE/ARB if needed in future      #High Grade AVB s/p CRT-P (2018 [facility])  - syncope with exertion, thus symptomatic   - seen by Dr. Virgen/Dr. Pace  - most recently seen by EP at ; admission for lead extraction     #T2DM   #DLD  - Metformin as lone agent (500mg in AM, 1000mg PM); transition to XR formulation, continue 1500mg daily dosing   [  ] Farxiga trial   [x] labs today: 6.9%  [x] urine albumin: normal   [  ] normal cholesterol, not on statin currently (LDL 88), CAC scoring with result 169 in 2024; Rosuva 20mg daily trial  [  ] currently takes ASA 81 daily; OK to continue for now, continue to reassess    #R Foot Injury s/p Open Reduction R 2/3/4 MTPJ with pinning of second Weil osteotomy and PIPJ arthrodesis (10/19/2023)   - clinical and radiographic healing at time of last visit   - compression stockings advised   - Dr. Melo had discussed the potential for metatarsal pad versus  with metatarsal head recession and padding to address his plantar foot pain, the patient had deferred     #Health Maintenance  CPE/MCW visit: 12/18/2024    Cancer Screening  - Colorectal Cancer Screening: COLOGUARD negative 04/2024 (repeat due 04/2027)   - Lung Cancer Screening: non-smoker   - Prostate Cancer Screening: reassuring 04/2024     Laboratory Screening  - Lipid Screen: new start statin; repeat labs in 6-8 weeks   - ASCVD Score: T2DM; CAC scoring also ordered  - A1C, glucose screen: 6.9%  - STI, HIV, Hep B screen:  negative 04/2024  - Hep C screen: negative 04/2024     Imaging Screening  - AAA screening: non-smoker     Immunizations:   - Flu advised annually, UTD 2024  - COVID vaccinated; recommend staying UTD with boosters   - RSV: recommended   - last Tdap unknown   - PCV-20: UTD 2024   - Shingrix: recommended    Other Screening  - Health Literacy Assessment: excellent, MD   - Depression screen: negative   - Home safety/partner violence screen: no concerns   - Hearing/Vision screens: optho referral, audiology referral in past   - Alcohol/tobacco/drug use screen: non-smoker   - Healthcare POA/Advanced Directives: wife     Referrals: new start statin, new star SGLT-2, transition to XR Metformin, repeat labs in 6-8 weeks; immunization guidance; AAA screening     Return to clinic in 4-6 months for follow-up of T2DM.     Patient Discussion:    Please call back the office with any questions at [phone]. In the case of an emergency, please call 911 or go to the nearest Emergency Department.      Steven Horton MD  Internal Medicine-Pediatrics  [facility] [address], [address]  P: [phone], F: [phone]

## 2025-02-03 ASSESSMENT — ENCOUNTER SYMPTOMS
LOSS OF SENSATION IN FEET: 0
DEPRESSION: 0
OCCASIONAL FEELINGS OF UNSTEADINESS: 0

## 2025-02-03 ASSESSMENT — ACTIVITIES OF DAILY LIVING (ADL)
MANAGING_FINANCES: INDEPENDENT
BATHING: INDEPENDENT
TAKING_MEDICATION: INDEPENDENT
DRESSING: INDEPENDENT
GROCERY_SHOPPING: INDEPENDENT
DOING_HOUSEWORK: INDEPENDENT

## 2025-04-09 ENCOUNTER — TELEPHONE (OUTPATIENT)
Dept: PRIMARY CARE | Facility: CLINIC | Age: 70
End: 2025-04-09
Payer: MEDICARE

## 2025-04-09 NOTE — TELEPHONE ENCOUNTER
Pt contacted the office 4/9/25 wanting to know if you would be willing to accept his son as a new pt?  Pt's son name is Haile Moody

## 2025-12-18 ENCOUNTER — APPOINTMENT (OUTPATIENT)
Dept: PRIMARY CARE | Facility: CLINIC | Age: 70
End: 2025-12-18
Payer: MEDICARE

## (undated) DEVICE — SUTURE, VICRYL, 3-0, 27 IN, CT-2, UNDYED

## (undated) DEVICE — BLADE, OSCILLATING/SAGITTAL, 25MM X 9MM

## (undated) DEVICE — TRAY, SURESTEP, URINE METER, 14FR, SILICONE

## (undated) DEVICE — Device

## (undated) DEVICE — CONNECTOR, STRAIGHT, 0.5 X 0.5 IN

## (undated) DEVICE — TIP, SUCTION, YANKAUER, FLEXIBLE

## (undated) DEVICE — MAYO TRAY, SMALL

## (undated) DEVICE — BANDAGE, GAUZE, COTTON, STERILE, BULKEE II, 4.5IN X 4.1YD

## (undated) DEVICE — SUTURE, ETHILON, 3-0, 18 IN, PS1, BLACK

## (undated) DEVICE — SHUNT, SENSOR

## (undated) DEVICE — KIT, WRENCH, STERILE, F/LEADS

## (undated) DEVICE — PREP KIT, BRIDGE, OCCLUSION

## (undated) DEVICE — KIT, FAST START

## (undated) DEVICE — GOWN, SURGICAL, SMARTGOWN, XLARGE, STERILE

## (undated) DEVICE — DRESSING, ADHESIVE, ISLAND, TELFA, 2 X 3.75 IN, LF

## (undated) DEVICE — ENVELOPE, ANTIBACTERIAL, AIGIS RX TYRX, ABSORBABLE, LRG

## (undated) DEVICE — FILTER, IV, BLOOD, MICROAGGREGATE, 40 MIC, RBC TRANSFUSION

## (undated) DEVICE — DRAPE, SHEET, U, W/ADHESIVE STRIP, IMPERVIOUS, 60 X 70 IN, DISPOSABLE, LF, STERILE

## (undated) DEVICE — CABLE, SURGICAL, SM CLIP

## (undated) DEVICE — GLOVE, SURGICAL, PROTEXIS PI BLUE W/NEUTHERA, 6.5, PF, LF

## (undated) DEVICE — TUBING, SUCTION, CONNECTING, STERILE 0.25 X 120 IN., LF

## (undated) DEVICE — BLADE, SAW, OSCILLATING/SAGITTAL, 7 X 18.5 X 0.38 MM

## (undated) DEVICE — KIT, COLLECTION, CARDIO

## (undated) DEVICE — CLOSURE SYSTEM, VASCULAR, MVP 6-12FR, VENOUS

## (undated) DEVICE — C-WIRE, SPADE TIP, .062 X 5IN

## (undated) DEVICE — DEVICE, LOCKING LLD, EZ

## (undated) DEVICE — SOLUTION, IRRIGATION, X RX SODIUM CHL 0.9%, 1000ML BTL

## (undated) DEVICE — DRESSING, MEPILEX, BORDER, SACRUM, 8.7 X 9.8 IN

## (undated) DEVICE — SHEATH, INTRODUCER, 10CM, 7FR, R/O RADIOPAQUE MARKER, 7FR DIA, 2.5 CM DILATOR.

## (undated) DEVICE — OXYGENATOR FX 25, W/HR, ARTERIAL FILTER

## (undated) DEVICE — CATHETER, ELCTROPHYSIOLOGY, DIAGNOSTIC, SUPREME, 4 ELECTRODE, 2-5-2 MM SPACING, JOSEPHSON CURVE, 6 FR X 120 CM

## (undated) DEVICE — SUTURE, PDS II, 1, 36 IN, CT, VIOLET

## (undated) DEVICE — PACK CUSTOM EXTREMITY

## (undated) DEVICE — TUBING, SUCTION, CONNECTING, NON-CONDUCTIVE, SURE GRIP CONNECTORS, 3/16 X 18 IN, PVC

## (undated) DEVICE — DRESSING, ISLAND, TELFA, 4 X 5 IN

## (undated) DEVICE — ELECTRODE, QUICK-COMBO, EDGE SYSTEM, REDI PACK

## (undated) DEVICE — COVER, CART, 45 X 27 X 48 IN, CLEAR

## (undated) DEVICE — GLOVE, SURGICAL, PROTEXIS PI BLUE W/NEUTHERA, 8.0, PF, LF

## (undated) DEVICE — INTRODUCER SYSTEM, PRELUDE SNAP, SPLITTABLE, 7 FR X 25 CM, ORANGE

## (undated) DEVICE — DRAPE, SHEET, CARDIOVASCULAR, ANTIMICROBIAL, W/ANESTHESIA SCREEN, IOBAN 2, STERI DRAPE, 107 X 133 IN, DISPOSABLE, FABRIC, BLUE, STERILE

## (undated) DEVICE — SUTURE, MONOCRYL, 2-0, 27 IN, SH/V-20 , UNDYED

## (undated) DEVICE — CONNECTOR, Y, CARDIOPULMONARY, 0.375 X 0.25 X 0.25 IN

## (undated) DEVICE — BANDAGE, ELASTIC, MATRIX, SELF-CLOSURE, 6 IN X 5 YD, LF

## (undated) DEVICE — MARKER, SKIN, DUAL TIP INK W/9 LABEL AND REMOVABLE TIME OUT SLEEVE

## (undated) DEVICE — ATS SUCTION LINE

## (undated) DEVICE — SHEATH, GLIDELIGHT LASER, 14FR X 50CM

## (undated) DEVICE — SPLINT, FAST SETTING, 5 X 30 IN, PLASTER

## (undated) DEVICE — TUBING, SMOKE EVAC, 3/8 X 10 FT

## (undated) DEVICE — CATHETER, DRAINAGE, NASOGASTRIC, DOUBLE LUMEN, FUNNEL END, SUMP, SALEM, 18 FR, 48 IN, PVC, STERILE

## (undated) DEVICE — PADDING, UNDERCAST, WEBRIL, 6 IN X 4 YD, REG, NS

## (undated) DEVICE — BLANKET, LOWER BODY, VHA PLUS, ADULT

## (undated) DEVICE — DRAPE COVER, C ARM, FLOUROSCAN IMAGING SYS

## (undated) DEVICE — WASH SET, XTRA, 125ML

## (undated) DEVICE — PADDING, UNDERCAST, WEBRIL, 4 IN X 4 YD, REG, NS

## (undated) DEVICE — GUIDEWIRE, ANGLE TIP,  .035 DIA, 180 CM, 3 CM TIP"

## (undated) DEVICE — CLIPPER, SURGICAL BLADE ASSEMBLY, GENERAL PURPOSE, SINGLE USE

## (undated) DEVICE — SHEATH, DILATOR, VISISHEATH SPEC, 14FR X 43CM, SZ MED

## (undated) DEVICE — DRESSING, GAUZE, SPONGE, KERLIX, SUPER, 6 X 6.75 IN, STERILE 10PK

## (undated) DEVICE — APPLICATOR, CHLORAPREP, W/ORANGE TINT, 26ML

## (undated) DEVICE — DRESSING, MEPILEX, BORDER, HEEL, 8.7 X 9.1 IN

## (undated) DEVICE — IMPLANTABLE DEVICE: Type: IMPLANTABLE DEVICE | Site: FOOT | Status: NON-FUNCTIONAL

## (undated) DEVICE — KWIRE, BUSA, .054 X 4IN, NO 2, STERILE

## (undated) DEVICE — KIT, STYLET, 52CM

## (undated) DEVICE — SUTURE, MONOCRYL, 3-0, 27 IN, PS-2, UNDYED

## (undated) DEVICE — APPLICATOR, PREP, ONE-STEP, ANTIMICROBIAL, CHLORAPREP, TINTED, 10.5ML

## (undated) DEVICE — ACCESSORY KIT, LEAD MDL 6056M

## (undated) DEVICE — WASH SET, XTRA, 225ML

## (undated) DEVICE — CATHETER, ANGIO, SOFT-VU, 0.038 IN, 5 FR X 65 CM, KUMPE

## (undated) DEVICE — KIT, CELL SAVER, W/COLLECTION SET, 225ML WASH SET

## (undated) DEVICE — ADAPTER, CORONARY, PERFUSION, Y, 34.3 CM

## (undated) DEVICE — CABLE, BLACK, QUADRIPOLAR

## (undated) DEVICE — OXYGENATOR FX 15, W/HR, ARTERIAL FILTER

## (undated) DEVICE — ADULT REM POLYHESIVE II PATIENT RETURN ELECTRODE W/9 FT (2.7 M) ATTACHED CORD

## (undated) DEVICE — SUTURE, VICRYL, 1, 36 IN, CT-1, UNDYED

## (undated) DEVICE — PAD, ELECTRODE DEFIB PADPRO ADULT STRL W/ADAPTER

## (undated) DEVICE — MANIFOLD, 4 PORT NEPTUNE STANDARD

## (undated) DEVICE — DRESSING, NON-ADHERENT, OIL EMULSION, CURITY, 3 X 8 IN, STERILE